# Patient Record
Sex: FEMALE | Race: WHITE | NOT HISPANIC OR LATINO | Employment: UNEMPLOYED | URBAN - NONMETROPOLITAN AREA
[De-identification: names, ages, dates, MRNs, and addresses within clinical notes are randomized per-mention and may not be internally consistent; named-entity substitution may affect disease eponyms.]

---

## 2021-06-14 PROBLEM — I45.6 WPW (WOLFF-PARKINSON-WHITE SYNDROME): Status: ACTIVE | Noted: 2021-06-14

## 2021-07-21 ENCOUNTER — OFFICE VISIT (OUTPATIENT)
Dept: CARDIOLOGY | Facility: CLINIC | Age: 51
End: 2021-07-21

## 2021-07-21 VITALS
SYSTOLIC BLOOD PRESSURE: 130 MMHG | HEIGHT: 66 IN | WEIGHT: 229 LBS | HEART RATE: 77 BPM | DIASTOLIC BLOOD PRESSURE: 90 MMHG | BODY MASS INDEX: 36.8 KG/M2

## 2021-07-21 DIAGNOSIS — R00.2 PALPITATIONS: ICD-10-CM

## 2021-07-21 DIAGNOSIS — R42 DIZZINESS: ICD-10-CM

## 2021-07-21 DIAGNOSIS — I45.6 WPW (WOLFF-PARKINSON-WHITE SYNDROME): Primary | ICD-10-CM

## 2021-07-21 PROCEDURE — 99204 OFFICE O/P NEW MOD 45 MIN: CPT | Performed by: INTERNAL MEDICINE

## 2021-07-21 PROCEDURE — 93000 ELECTROCARDIOGRAM COMPLETE: CPT | Performed by: NURSE PRACTITIONER

## 2021-07-21 RX ORDER — FLECAINIDE ACETATE 100 MG/1
100 TABLET ORAL 2 TIMES DAILY
Qty: 180 TABLET | Refills: 3 | Status: SHIPPED | OUTPATIENT
Start: 2021-07-21 | End: 2021-12-15 | Stop reason: SINTOL

## 2021-07-21 NOTE — PROGRESS NOTES
Cardiac Electrophysiology Outpatient Consult Note            [facility] at [facility]    Consult Note     Taylor Infante  [id number]  07/21/2021  [phone]     Primary Care Physician: Tobias Torres APRN    Referred By: Reggie Jiang DO Subjective     Chief Complaint:   Diagnoses and all orders for this visit:    1. WPW (Desiree-Parkinson-White syndrome) (Primary)    2. Palpitations    3. Dizziness    Other orders  -     ECG 12 Lead      Chief Complaint   Patient presents with   • Desiree-Parkinson-White Syndrome       History of Present Illness:   Taylor Infante is a 50 y.o. female who presents to my electrophysiology clinic for evaluation of her WPW.  Upon my evaluation patient has a very long known history of WPW with attempted ablation x3 in the past.  She reports her first ablation was in the 1990s per Dr. Mccoy that was unsuccessful.  For her second ablation she was referred to [place] to a EP specialist who also performed a second ablation that was unsuccessful.  Patient had a third and last ablation performed in 2003 per Dr. Fontenot and her juancarlos that again was unfortunately unsuccessful.  Patient continues to have intermittent tachypalpitations associated with increased shortness of breath and dizziness that make her feel terrible.  She states she has a prolonged episode at least once a month that will prompt her to present to the ER and stops and comes on the way.  She has had adenosine administration several times in the past year.  She states it is manageable when she receives this.  Patient denies chest pain, presyncope, syncope.  Patient denies TIA/strokelike symptoms.      Review of Systems:   Constitutional: No fevers or chills, no recent weight gain or weight loss or fatigue  Eyes: No visual loss, blurred vision, double vision, yellow sclerae.  ENT: No headaches, hearing loss, vertigo, congestion or sore throat.   Cardiovascular: Per HPI  Respiratory: No  cough or wheezing, no sputum production, no hematemesis   Gastrointestinal: No abdominal pain, no nausea, vomiting, constipation, diarrhea, melena.   Genitourinary: No dysuria, hematuria or increased frequency.  Musculoskeletal:  No gait disturbance, weakness or joint pain or stiffness  Integumentary: No rashes, urticaria, ulcers or sores.   Neurological: No headache, syncope, paralysis, ataxia, no prior CVA/TIA  Psychiatric: No anxiety, or depression  Endocrine: No diaphoresis, cold or heat intolerance. No polyuria or polydipsia.   Hematologic/Lymphatic: No anemia, abnormal bruising or bleeding. No history of DVT/PE.     Past Medical History:   Past Medical History:   Diagnosis Date   • Arrhythmia    • Arthritis    • Chronic back pain    • Compression fracture of lumbar vertebra (CMS/HCC) 8/22/2016    L1-L2 w/ Significant kyphotic deformity. She appears to have solid arthrodesis from L2-L5. Flexion and extension films showed very litte movvement at the L1-L2 level.     • Depression    • Hearing loss    • Hypertension    • Hypothyroidism    • Osteopetrosis    • Venous insufficiency        Past Surgical History:   Past Surgical History:   Procedure Laterality Date   • CARDIAC ELECTROPHYSIOLOGY STUDY AND ABLATION  2003    Dr. Mccoy (Dr. Fontenot assisting)   • CHOLECYSTECTOMY  2003   • HYSTERECTOMY  2002    partial    • MASTOIDECTOMY  1972   • POSTERIOR LUMBAR/THORACIC SPINE FUSION  03/24/2015     l2-l5    • STOMACH SURGERY      Gastric Bypass    • TONSILLECTOMY  1982       Family History:   Family History   Problem Relation Age of Onset   • Diabetes Other    • Alzheimer's disease Other    • Hypertension Other    • Stroke Other    • Valvular heart disease Father    • Cancer Father        Social History:   Social History     Socioeconomic History   • Marital status:      Spouse name: Not on file   • Number of children: Not on file   • Years of education: Not on file   • Highest education level: Not on file    Tobacco Use   • Smoking status: Never Smoker   • Smokeless tobacco: Never Used   Vaping Use   • Vaping Use: Never used   Substance and Sexual Activity   • Alcohol use: Never   • Drug use: Defer   • Sexual activity: Defer       Medications:     Current Outpatient Medications:   •  albuterol sulfate  (90 Base) MCG/ACT inhaler, Inhale 2 puffs Every 4 (Four) Hours As Needed., Disp: , Rfl:   •  amitriptyline (ELAVIL) 10 MG tablet, Take 10 mg by mouth Every Night., Disp: , Rfl:   •  aspirin 81 MG EC tablet, Take 81 mg by mouth Daily., Disp: , Rfl:   •  cetirizine (zyrTEC) 10 MG tablet, Take 10 mg by mouth Daily., Disp: , Rfl:   •  clonazePAM (KlonoPIN) 0.5 MG tablet, Take 0.5 mg by mouth 2 (Two) Times a Day As Needed., Disp: , Rfl:   •  docusate sodium (COLACE) 100 MG capsule, Take 200 mg by mouth Daily., Disp: , Rfl:   •  hydroCHLOROthiazide (HYDRODIURIL) 25 MG tablet, Take 25 mg by mouth Daily., Disp: , Rfl:   •  losartan (COZAAR) 50 MG tablet, Take 50 mg by mouth Daily., Disp: , Rfl:   •  meclizine (ANTIVERT) 25 MG tablet, Take 25 mg by mouth 2 (Two) Times a Day As Needed., Disp: , Rfl:   •  metoprolol succinate XL (TOPROL-XL) 50 MG 24 hr tablet, Take 50 mg by mouth Daily., Disp: , Rfl:   •  metroNIDAZOLE (METROCREAM) 0.75 % cream, Apply  topically to the appropriate area as directed 2 (Two) Times a Day., Disp: , Rfl:   •  omeprazole (priLOSEC) 20 MG capsule, Take 20 mg by mouth Daily., Disp: , Rfl:   •  pain patient supplied pump, by Intrathecal route Continuous. Dilaudid/Bupivicaine, Disp: , Rfl:   •  potassium chloride (K-DUR,KLOR-CON) 10 MEQ CR tablet, Take 10 mEq by mouth Daily., Disp: , Rfl:   •  Probiotic Product (PROBIOTIC DAILY PO), Take  by mouth 3 (Three) Times a Day., Disp: , Rfl:     Allergies:   Allergies   Allergen Reactions   • Bupropion Mental Status Change   • Naltrexone Mental Status Change   • Fentanyl Hives and Itching       Objective   Vital Signs:   Vitals:    07/21/21 1315   BP: 130/90  "  BP Location: Left arm   Patient Position: Sitting   Pulse: 77   Weight: 104 kg (229 lb)   Height: 167.6 cm (66\")       PHYSICAL EXAM  General appearance: Awake, alert, cooperative  Head: Normocephalic, without obvious abnormality, atraumatic  Eyes: Conjunctivae/corneas clear, EOMs intact  Neck: no adenopathy, no carotid bruit, no JVD and thyroid: not enlarged  Lungs: clear to auscultation bilaterally and no rhonchi or crackles\", ' symmetric  Heart: regular rate and rhythm, S1, S2 normal, no murmur, click, rub or gallop  Abdomen: Soft, non-tender, bowel sounds normal,  no organomegaly  Extremities: extremities normal, atraumatic, no cyanosis or edema  Skin: Skin color, turgor normal, no rashes or lesions  Neurologic: Grossly normal     Cardiac Testing:      I personally viewed and interpreted the patient's EKG/Telemetry/lab data      ECG 12 Lead    Date/Time: 7/21/2021 2:02 PM  Performed by: Anurag Mendoza APRN  Authorized by: Anurag Mendoza APRN   Comparison: not compared with previous ECG   Rhythm: sinus rhythm  BPM: 77  Comments: WPW           Assessment & Plan    Diagnoses and all orders for this visit:    1. WPW (Desiree-Parkinson-White syndrome) (Primary)    2. Palpitations    3. Dizziness    Other orders  -     ECG 12 Lead         Diagnosis Plan   1. WPW (Desiree-Parkinson-White syndrome)   clearly highly symptomatic.  Has had multiple episodes of syncope without warning.  Left EP procedure indicated that this is a high risk pathway with a accessory pathway effective refractory period of less than 240 ms.    Manifest preexcitation today.    I think it certainly is worth a fourth attempt.  She has had prior ablations of multiple counters with multiple operators.  These were all several years ago.    At this time she is interested in proceeding with ablation.  We discussed that this procedure would be best performed under general anesthetic.  We discussed that the we have new mapping technologies " and ablation technologies which may typically in her favor.    This procedure will likely take several hours.  There is possibility of keeping her overnight as well.  She understands the risk-benefit profile and I quoted her a 1% chance of significant procedure complication and a approximately 90% chance of illuminating accessory pathway.    In the meantime in order to afford her some freedom from symptoms which are almost daily at this point we will place her on moderate dose of flecainide.  100 mg twice daily will be the dose.  We will have her stop this 3 days before the ablation.  Rhythmia will be the mapping system.    The patient and I made a mutually shared decision ( shared decision making model ) that the patient would be best served by proceeding with the above invasive heart procedure.  This is a high risk invasive cardiac procedure which comes with significant risk of morbidity and mortality.  This patient has significant underlying  heart disease.  Taylor Infante understands these risks and   has made an informed decision to proceed.     2. Palpitations   due to WPW   3. Dizziness   due to WPW     Body mass index is 36.96 kg/m².    I spent 49 minutes in consultation with this patient which included more than 65% of this time in direct face-to-face counseling, physical examination and discussion of my assessment and findings and shared decision making with the patient.  The remainder of the time not spent face to face was performing one, some or all of the following actions:  preparing to see this patient ( eg. Review of tests),  ordering medications, tests or procedures ), care coordination, discussion of the plan with other healthcare providers, documenting clinical information in Epic well as independently interpreting results and communicating results to patient, family and or caregiver.  All time noted occurred on the date of service.    Follow Up:      Scribed for Alberto Doyle DO by Anurag BULL  OMAR Mendoza . 7/21/2021  14:05 EDT     Thank you for allowing me to participate in the care of your patient. Please to not hesitate to contact me with additional questions or concerns.      Alberto Doyle DO, FACC, RS  Cardiac Electrophysiologist  [facility] / [facility]

## 2021-08-05 ENCOUNTER — PREP FOR SURGERY (OUTPATIENT)
Dept: OTHER | Facility: HOSPITAL | Age: 51
End: 2021-08-05

## 2021-08-05 DIAGNOSIS — I45.6 WPW (WOLFF-PARKINSON-WHITE SYNDROME): Primary | ICD-10-CM

## 2021-08-05 RX ORDER — SODIUM CHLORIDE 0.9 % (FLUSH) 0.9 %
3 SYRINGE (ML) INJECTION EVERY 12 HOURS SCHEDULED
Status: CANCELLED | OUTPATIENT
Start: 2021-08-05

## 2021-08-05 RX ORDER — ACETAMINOPHEN 325 MG/1
650 TABLET ORAL EVERY 4 HOURS PRN
Status: CANCELLED | OUTPATIENT
Start: 2021-08-05

## 2021-08-05 RX ORDER — SODIUM CHLORIDE 0.9 % (FLUSH) 0.9 %
10 SYRINGE (ML) INJECTION AS NEEDED
Status: CANCELLED | OUTPATIENT
Start: 2021-08-05

## 2021-08-05 RX ORDER — NITROGLYCERIN 0.4 MG/1
0.4 TABLET SUBLINGUAL
Status: CANCELLED | OUTPATIENT
Start: 2021-08-05

## 2021-08-30 ENCOUNTER — APPOINTMENT (OUTPATIENT)
Dept: PREADMISSION TESTING | Facility: HOSPITAL | Age: 51
End: 2021-08-30

## 2021-08-30 ENCOUNTER — PRE-ADMISSION TESTING (OUTPATIENT)
Dept: PREADMISSION TESTING | Facility: HOSPITAL | Age: 51
End: 2021-08-30

## 2021-08-30 DIAGNOSIS — I45.6 WPW (WOLFF-PARKINSON-WHITE SYNDROME): ICD-10-CM

## 2021-08-30 LAB
ANION GAP SERPL CALCULATED.3IONS-SCNC: 15 MMOL/L (ref 5–15)
BUN SERPL-MCNC: 5 MG/DL (ref 6–20)
BUN/CREAT SERPL: 10.2 (ref 7–25)
CALCIUM SPEC-SCNC: 9.2 MG/DL (ref 8.6–10.5)
CHLORIDE SERPL-SCNC: 101 MMOL/L (ref 98–107)
CO2 SERPL-SCNC: 23 MMOL/L (ref 22–29)
CREAT SERPL-MCNC: 0.49 MG/DL (ref 0.57–1)
DEPRECATED RDW RBC AUTO: 40.3 FL (ref 37–54)
ERYTHROCYTE [DISTWIDTH] IN BLOOD BY AUTOMATED COUNT: 11.6 % (ref 12.3–15.4)
GFR SERPL CREATININE-BSD FRML MDRD: 134 ML/MIN/1.73
GLUCOSE SERPL-MCNC: 143 MG/DL (ref 65–99)
HCT VFR BLD AUTO: 45.2 % (ref 34–46.6)
HGB BLD-MCNC: 15.5 G/DL (ref 12–15.9)
MCH RBC QN AUTO: 32.5 PG (ref 26.6–33)
MCHC RBC AUTO-ENTMCNC: 34.3 G/DL (ref 31.5–35.7)
MCV RBC AUTO: 94.8 FL (ref 79–97)
PLATELET # BLD AUTO: 253 10*3/MM3 (ref 140–450)
PMV BLD AUTO: 9.9 FL (ref 6–12)
POTASSIUM SERPL-SCNC: 4 MMOL/L (ref 3.5–5.2)
RBC # BLD AUTO: 4.77 10*6/MM3 (ref 3.77–5.28)
SARS-COV-2 RNA PNL SPEC NAA+PROBE: NOT DETECTED
SODIUM SERPL-SCNC: 139 MMOL/L (ref 136–145)
WBC # BLD AUTO: 8.02 10*3/MM3 (ref 3.4–10.8)

## 2021-08-30 PROCEDURE — 36415 COLL VENOUS BLD VENIPUNCTURE: CPT

## 2021-08-30 PROCEDURE — C9803 HOPD COVID-19 SPEC COLLECT: HCPCS

## 2021-08-30 PROCEDURE — 85027 COMPLETE CBC AUTOMATED: CPT

## 2021-08-30 PROCEDURE — 80048 BASIC METABOLIC PNL TOTAL CA: CPT

## 2021-08-30 PROCEDURE — U0004 COV-19 TEST NON-CDC HGH THRU: HCPCS

## 2021-08-30 RX ORDER — MULTIVIT WITH MINERALS/LUTEIN
500 TABLET ORAL DAILY
COMMUNITY
End: 2022-04-20 | Stop reason: ALTCHOICE

## 2021-09-01 ENCOUNTER — ANESTHESIA EVENT (OUTPATIENT)
Dept: CARDIOLOGY | Facility: HOSPITAL | Age: 51
End: 2021-09-01

## 2021-09-02 ENCOUNTER — HOSPITAL ENCOUNTER (OUTPATIENT)
Facility: HOSPITAL | Age: 51
Discharge: HOME | End: 2021-09-02
Attending: INTERNAL MEDICINE | Admitting: INTERNAL MEDICINE

## 2021-09-02 ENCOUNTER — ANESTHESIA (OUTPATIENT)
Dept: CARDIOLOGY | Facility: HOSPITAL | Age: 51
End: 2021-09-02

## 2021-09-02 VITALS
OXYGEN SATURATION: 90 % | TEMPERATURE: 97.2 F | HEIGHT: 65 IN | HEART RATE: 76 BPM | DIASTOLIC BLOOD PRESSURE: 80 MMHG | SYSTOLIC BLOOD PRESSURE: 117 MMHG | WEIGHT: 225.97 LBS | BODY MASS INDEX: 37.65 KG/M2 | RESPIRATION RATE: 16 BRPM

## 2021-09-02 DIAGNOSIS — I45.6 WPW (WOLFF-PARKINSON-WHITE SYNDROME): ICD-10-CM

## 2021-09-02 PROCEDURE — C1766 INTRO/SHEATH,STRBLE,NON-PEEL: HCPCS | Performed by: INTERNAL MEDICINE

## 2021-09-02 PROCEDURE — 93621 COMP EP EVL L PAC&REC C SINS: CPT | Performed by: INTERNAL MEDICINE

## 2021-09-02 PROCEDURE — 25010000003 LIDOCAINE 1 % SOLUTION: Performed by: NURSE ANESTHETIST, CERTIFIED REGISTERED

## 2021-09-02 PROCEDURE — C1730 CATH, EP, 19 OR FEW ELECT: HCPCS | Performed by: INTERNAL MEDICINE

## 2021-09-02 PROCEDURE — C1894 INTRO/SHEATH, NON-LASER: HCPCS | Performed by: INTERNAL MEDICINE

## 2021-09-02 PROCEDURE — S0260 H&P FOR SURGERY: HCPCS | Performed by: NURSE PRACTITIONER

## 2021-09-02 PROCEDURE — 25010000002 PROTAMINE SULFATE PER 10 MG: Performed by: INTERNAL MEDICINE

## 2021-09-02 PROCEDURE — 85347 COAGULATION TIME ACTIVATED: CPT

## 2021-09-02 PROCEDURE — 93010 ELECTROCARDIOGRAM REPORT: CPT | Performed by: INTERNAL MEDICINE

## 2021-09-02 PROCEDURE — C1760 CLOSURE DEV, VASC: HCPCS | Performed by: INTERNAL MEDICINE

## 2021-09-02 PROCEDURE — C1759 CATH, INTRA ECHOCARDIOGRAPHY: HCPCS | Performed by: INTERNAL MEDICINE

## 2021-09-02 PROCEDURE — 25010000002 NEOSTIGMINE 10 MG/10ML SOLUTION: Performed by: NURSE ANESTHETIST, CERTIFIED REGISTERED

## 2021-09-02 PROCEDURE — 25010000002 PHENYLEPHRINE 10 MG/ML SOLUTION 1 ML VIAL: Performed by: NURSE ANESTHETIST, CERTIFIED REGISTERED

## 2021-09-02 PROCEDURE — 93653 COMPRE EP EVAL TX SVT: CPT | Performed by: INTERNAL MEDICINE

## 2021-09-02 PROCEDURE — 93613 INTRACARDIAC EPHYS 3D MAPG: CPT | Performed by: INTERNAL MEDICINE

## 2021-09-02 PROCEDURE — C1732 CATH, EP, DIAG/ABL, 3D/VECT: HCPCS | Performed by: INTERNAL MEDICINE

## 2021-09-02 PROCEDURE — 93005 ELECTROCARDIOGRAM TRACING: CPT | Performed by: INTERNAL MEDICINE

## 2021-09-02 PROCEDURE — 25010000002 PROPOFOL 10 MG/ML EMULSION: Performed by: NURSE ANESTHETIST, CERTIFIED REGISTERED

## 2021-09-02 PROCEDURE — 93662 INTRACARDIAC ECG (ICE): CPT | Performed by: INTERNAL MEDICINE

## 2021-09-02 PROCEDURE — 93623 PRGRMD STIMJ&PACG IV RX NFS: CPT | Performed by: INTERNAL MEDICINE

## 2021-09-02 PROCEDURE — 25010000002 HEPARIN (PORCINE) PER 1000 UNITS: Performed by: INTERNAL MEDICINE

## 2021-09-02 PROCEDURE — 25010000002 DEXAMETHASONE PER 1 MG: Performed by: NURSE ANESTHETIST, CERTIFIED REGISTERED

## 2021-09-02 PROCEDURE — 25010000002 ONDANSETRON PER 1 MG: Performed by: NURSE ANESTHETIST, CERTIFIED REGISTERED

## 2021-09-02 RX ORDER — LIDOCAINE HYDROCHLORIDE 10 MG/ML
INJECTION, SOLUTION INFILTRATION; PERINEURAL AS NEEDED
Status: DISCONTINUED | OUTPATIENT
Start: 2021-09-02 | End: 2021-09-02 | Stop reason: SURG

## 2021-09-02 RX ORDER — ASPIRIN 81 MG/1
81 TABLET ORAL DAILY
Status: DISCONTINUED | OUTPATIENT
Start: 2021-09-02 | End: 2021-09-02 | Stop reason: HOSPADM

## 2021-09-02 RX ORDER — ONDANSETRON 2 MG/ML
INJECTION INTRAMUSCULAR; INTRAVENOUS AS NEEDED
Status: DISCONTINUED | OUTPATIENT
Start: 2021-09-02 | End: 2021-09-02 | Stop reason: SURG

## 2021-09-02 RX ORDER — NEOSTIGMINE METHYLSULFATE 1 MG/ML
INJECTION, SOLUTION INTRAVENOUS AS NEEDED
Status: DISCONTINUED | OUTPATIENT
Start: 2021-09-02 | End: 2021-09-02 | Stop reason: SURG

## 2021-09-02 RX ORDER — NITROGLYCERIN 0.4 MG/1
0.4 TABLET SUBLINGUAL
Status: DISCONTINUED | OUTPATIENT
Start: 2021-09-02 | End: 2021-09-02 | Stop reason: HOSPADM

## 2021-09-02 RX ORDER — GLYCOPYRROLATE 0.2 MG/ML
INJECTION INTRAMUSCULAR; INTRAVENOUS AS NEEDED
Status: DISCONTINUED | OUTPATIENT
Start: 2021-09-02 | End: 2021-09-02 | Stop reason: SURG

## 2021-09-02 RX ORDER — PROPOFOL 10 MG/ML
VIAL (ML) INTRAVENOUS AS NEEDED
Status: DISCONTINUED | OUTPATIENT
Start: 2021-09-02 | End: 2021-09-02 | Stop reason: SURG

## 2021-09-02 RX ORDER — PROTAMINE SULFATE 10 MG/ML
INJECTION, SOLUTION INTRAVENOUS AS NEEDED
Status: DISCONTINUED | OUTPATIENT
Start: 2021-09-02 | End: 2021-09-02 | Stop reason: HOSPADM

## 2021-09-02 RX ORDER — HYDROMORPHONE HYDROCHLORIDE 1 MG/ML
0.5 INJECTION, SOLUTION INTRAMUSCULAR; INTRAVENOUS; SUBCUTANEOUS
Status: DISCONTINUED | OUTPATIENT
Start: 2021-09-02 | End: 2021-09-02 | Stop reason: HOSPADM

## 2021-09-02 RX ORDER — ACETAMINOPHEN 325 MG/1
650 TABLET ORAL EVERY 4 HOURS PRN
Status: DISCONTINUED | OUTPATIENT
Start: 2021-09-02 | End: 2021-09-02 | Stop reason: HOSPADM

## 2021-09-02 RX ORDER — DEXAMETHASONE SODIUM PHOSPHATE 4 MG/ML
INJECTION, SOLUTION INTRA-ARTICULAR; INTRALESIONAL; INTRAMUSCULAR; INTRAVENOUS; SOFT TISSUE AS NEEDED
Status: DISCONTINUED | OUTPATIENT
Start: 2021-09-02 | End: 2021-09-02 | Stop reason: SURG

## 2021-09-02 RX ORDER — HEPARIN SODIUM 10000 [USP'U]/100ML
INJECTION, SOLUTION INTRAVENOUS CONTINUOUS PRN
Status: DISCONTINUED | OUTPATIENT
Start: 2021-09-02 | End: 2021-09-02 | Stop reason: HOSPADM

## 2021-09-02 RX ORDER — ROCURONIUM BROMIDE 10 MG/ML
INJECTION, SOLUTION INTRAVENOUS AS NEEDED
Status: DISCONTINUED | OUTPATIENT
Start: 2021-09-02 | End: 2021-09-02 | Stop reason: SURG

## 2021-09-02 RX ORDER — ONDANSETRON 2 MG/ML
4 INJECTION INTRAMUSCULAR; INTRAVENOUS ONCE AS NEEDED
Status: DISCONTINUED | OUTPATIENT
Start: 2021-09-02 | End: 2021-09-02 | Stop reason: HOSPADM

## 2021-09-02 RX ORDER — HYDROCHLOROTHIAZIDE 25 MG/1
25 TABLET ORAL DAILY
Status: DISCONTINUED | OUTPATIENT
Start: 2021-09-03 | End: 2021-09-02 | Stop reason: HOSPADM

## 2021-09-02 RX ORDER — FLECAINIDE ACETATE 50 MG/1
100 TABLET ORAL 2 TIMES DAILY
Status: DISCONTINUED | OUTPATIENT
Start: 2021-09-02 | End: 2021-09-02 | Stop reason: HOSPADM

## 2021-09-02 RX ORDER — SODIUM CHLORIDE 0.9 % (FLUSH) 0.9 %
3 SYRINGE (ML) INJECTION EVERY 12 HOURS SCHEDULED
Status: DISCONTINUED | OUTPATIENT
Start: 2021-09-02 | End: 2021-09-02 | Stop reason: HOSPADM

## 2021-09-02 RX ORDER — IPRATROPIUM BROMIDE AND ALBUTEROL SULFATE 2.5; .5 MG/3ML; MG/3ML
3 SOLUTION RESPIRATORY (INHALATION) ONCE AS NEEDED
Status: DISCONTINUED | OUTPATIENT
Start: 2021-09-02 | End: 2021-09-02 | Stop reason: HOSPADM

## 2021-09-02 RX ORDER — CEFAZOLIN SODIUM 2 G/100ML
2 INJECTION, SOLUTION INTRAVENOUS ONCE
Status: DISCONTINUED | OUTPATIENT
Start: 2021-09-02 | End: 2021-09-02 | Stop reason: HOSPADM

## 2021-09-02 RX ORDER — LOSARTAN POTASSIUM 50 MG/1
50 TABLET ORAL DAILY
Status: DISCONTINUED | OUTPATIENT
Start: 2021-09-03 | End: 2021-09-02 | Stop reason: HOSPADM

## 2021-09-02 RX ORDER — HEPARIN SODIUM 1000 [USP'U]/ML
INJECTION, SOLUTION INTRAVENOUS; SUBCUTANEOUS AS NEEDED
Status: DISCONTINUED | OUTPATIENT
Start: 2021-09-02 | End: 2021-09-02 | Stop reason: HOSPADM

## 2021-09-02 RX ORDER — SODIUM CHLORIDE 0.9 % (FLUSH) 0.9 %
10 SYRINGE (ML) INJECTION AS NEEDED
Status: DISCONTINUED | OUTPATIENT
Start: 2021-09-02 | End: 2021-09-02 | Stop reason: HOSPADM

## 2021-09-02 RX ORDER — METOPROLOL SUCCINATE 50 MG/1
50 TABLET, EXTENDED RELEASE ORAL DAILY
Status: DISCONTINUED | OUTPATIENT
Start: 2021-09-02 | End: 2021-09-02 | Stop reason: HOSPADM

## 2021-09-02 RX ORDER — SODIUM CHLORIDE 9 MG/ML
INJECTION, SOLUTION INTRAVENOUS CONTINUOUS PRN
Status: DISCONTINUED | OUTPATIENT
Start: 2021-09-02 | End: 2021-09-02 | Stop reason: SURG

## 2021-09-02 RX ADMIN — PROPOFOL 150 MG: 10 INJECTION, EMULSION INTRAVENOUS at 11:48

## 2021-09-02 RX ADMIN — SODIUM CHLORIDE: 9 INJECTION, SOLUTION INTRAVENOUS at 11:38

## 2021-09-02 RX ADMIN — ROCURONIUM BROMIDE 20 MG: 10 INJECTION INTRAVENOUS at 12:10

## 2021-09-02 RX ADMIN — LIDOCAINE HYDROCHLORIDE 50 MG: 10 INJECTION, SOLUTION INFILTRATION; PERINEURAL at 11:48

## 2021-09-02 RX ADMIN — ROCURONIUM BROMIDE 10 MG: 10 INJECTION INTRAVENOUS at 13:00

## 2021-09-02 RX ADMIN — ONDANSETRON 4 MG: 2 INJECTION INTRAMUSCULAR; INTRAVENOUS at 14:46

## 2021-09-02 RX ADMIN — DEXAMETHASONE SODIUM PHOSPHATE 8 MG: 4 INJECTION, SOLUTION INTRAMUSCULAR; INTRAVENOUS at 11:53

## 2021-09-02 RX ADMIN — ROCURONIUM BROMIDE 50 MG: 10 INJECTION INTRAVENOUS at 11:48

## 2021-09-02 RX ADMIN — GLYCOPYRROLATE 0.4 MG: 0.2 INJECTION INTRAMUSCULAR; INTRAVENOUS at 14:59

## 2021-09-02 RX ADMIN — PHENYLEPHRINE HYDROCHLORIDE 0.5 MCG/KG/MIN: 10 INJECTION INTRAVENOUS at 12:16

## 2021-09-02 RX ADMIN — NEOSTIGMINE METHYLSULFATE 3 MG: 1 INJECTION, SOLUTION INTRAVENOUS at 14:59

## 2021-09-02 NOTE — OP NOTE
Cardiac Electrophysiology Procedure Note       [facility] at [facility]    CATHETER ABLATION OF SUPRAVENTRICULAR TACHYCARDIA    PROCEDURES PERFORMED:   · Full diagnostic EP study  · Drug infusion / programmed pacing  · left atrial pacing and recording  · 3D electroanatomic mapping  · phased-array intracardiac echocardiography    PREPROCEDURAL DIAGNOSES:    1.  Desiree-Parkinson-White syndrome with documented SVT    POST PROCEDURE DIANGOSES:    1.  Right anterior tricuspid valve annulus accessory pathway /epicardial    CONSENT:  The patient was able to give written informed consent after revisiting the key portions of the risk versus benefit profile of the procedure.  This discussion was framed by our lengthy conversations  (please see our detailed notes).  Patient verbalized strong understanding of this discussion and a strong desire to proceed with the procedure.  Please note that this detailed informed consent process utilized mutual and shared decision making process between all parties involved, principally the physician and patient, but also potentially with input from the patient's selected family and friends.    General anesthesia was selected for this patient    INDICATION FOR PROCEDURE:  Briefly, Taylor Infante is a 51 y.o. year old female with a history of highly symptomatic drug refractory recurrent SVT documented with a long history of suffering from arrhythmia.  She underwent 3 catheter ablations all unsuccessful most recent one was in 2003.  She [facility] a distance center in [facility] for one of her ablation procedures and had the other 2 here at this facility.  She was interested in having a repeat attempt.  She presented electively for this outpatient procedure.    Problem List:     1. WPW  a. S/p RFA x 3   b. S/p RFA, 2003 with Dr. Mccoy   2. HTN  3. Hypothyroidism  4. Arthritis  5. Chronic Back Pain  a. Compression fracture of lumbar vertebra, 8/22/16,  L1-L2 w/ significant kyphotic deformity. Solid arthrodesis from L2-L5.   6. Depression  7. Hearing loss   8. Osteoporosis  9. Venous insufficiency  10. Past Surgical Hx  a. RFA, 2003, Dr. Mccoy  b. Cholecystectomy, 2003  c. Partial hysterectomy, 2002  d. Mastoidectomy, 1972  e. Posterior lumbar/ thoracic spine infusion, L2-L5, 3/24/15  f. Gastric bypass  g. Tonsillectomy, 1982       PROCEDURE NARRATIVE:    The patient was brought to the EP laboratory in the post absorptive state. The patient was then prepared and draped in a routing sterile fashion.  Seldinger access was obtained at the right common femoral vein with three venipunctures.  J tip wires were advanced into the vascular space.  A short 6 [place] sheaths and an SRO sheath were placed into the right common femoral vein and the inferior vena cava / right atrium in an over the wire fashion.    CATHETERS USED FOR THE PROCEDURE:    Quadripolar electrophysiology catheters was placed to the right ventricular apex.    A crd 2 quadripolar electrophysiology catheter was placed to the compact AV node his bundle position.    An 8 [place] decapolar electrophysiology catheter was placed into the coronary sinus for left atrial pacing recording.    64 electrode [employer] [place] electrophysiology catheter was used for pacing recording the right atrium right ventricle the right atrial appendage Triangle of Harris His bundle and coronary sinus.    A 4 mm [employer] irrigated tip ablation catheter was used for ablation as well as for pacing recording and all of the above-mentioned structures.    Adequate pacing thresholds were confirmed for all catheters.    Complete diagnostic EP study was performed at this point.  Data obtained from this is listed in the below table:     Initial Study    Isuprel Washout Study      NV  86     drive train / burst extrastim    QRS  136    Rhythm      QT  450    Atrial CL      R-R  908     Ventricular CL      AH  59    AVBCL       HV  -3    AVNERP       drive train / burst extrastim   Slow Pway ERP      Rhythm     Fast Pway ERP      Atrial CL     VABCL conc? /  Dec?      Ventricular CL     VAERP      AVBCL  290    AERP      AVNERP  600  270   VERP      Slow Pway ERP     AP antegrade ERP      Fast Pway ERP     AP retrograde ERP      VABCL conc? /  Dec?   340          VAERP  600 /400  270/230  Final Study      AERP      drive train / burst extrastim    VERP  400  230   Rhythm  pre-excited sinus rhythm     AP antegrade ERP  600  greater than 270   Atrial CL      AP retrograde ERP  600  greater than 350   Ventricular CL           AVBCL  350    Isuprel Dose =      AVNERP  600  240     drive train / burst extrastim   Slow Pway ERP      Rhythm     Fast Pway ERP      Atrial CL     VABCL conc? /  Dec?   270     Ventricular CL     VAERP  600  less than 270    AVBCL     AERP      AVNERP     VERP      Slow Pway ERP     AP antegrade ERP      Fast Pway ERP     AP retrograde ERP      VABCL conc? /  Dec?           VAERP           AERP           VERP           AP antegrade ERP           AP retrograde ERP                         VT Induction EP study ([facility] Protocol)        No Isuprel     Site One (RV [place] /  RV Septum)      Drive Train V ERP    350     400     600 / 550         Site Two (RV Outflow Tract /  RV Septum)     Drive Train V ERP    350     400     600 / 550         Isuprel Dose =      Site One (RV [place] /  RV Septum)      Drive Train V ERP    350     400     600 / 550         Site Two (RV Outflow Tract /  RV Septum)     Drive Train V ERP    350     400     600 / 550        Please note we used the [employer] 3D electroanatomic mapping system to construct a highly detailed structure of the tachycardia circuit right atrium right ventricle AV node position His bundle position triangle of Harris coronary sinus compact AV node and both right and left with extensions of the AV node slow pathway.  Activation map were performed during atrial  "pacing at cycle length of 350 ms to allow for maximum preexcitation.  Additionally also mapped and obtained to completely except.  Activation map during orthodromic reciprocating tachycardia.  More than 10,000 activation points were obtained.    MANEUVERS FOR SVT DIAGNOSIS:    SVT was very easily induced.  Atrial burst pacing or atrial extrastimulus testing was successful in inducing tachycardia.  Whereas there was preexcitation during sinus rhythm during SVT there was none.  VA activation was concentric.  There was a one-to-one AV relationship.      During tachycardia, I paced the ventricle at 20 milliseconds faster than the tachycardia cycle length.  During pacing, the atrium was accelerated to the pacing cycle length.  Upon cessation of pacing the tachycardia continued at its initial cycle length and a \"VAV\" response was noted.  Thus atrial tachycardia was excluded.    Additionally, the post pacing interval was noted to be 80.  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      ABLATION PORTION OF THE PROCEDURE:    After extensive and careful mapping during tachycardia as well as during presided sinus rhythm and atrial paced rhythm we carefully positioned the ablation catheter at the tricuspid valve annulus endocardially at the site of earliest ventricular activation.  Radiofrequency application was given here.  There is evident area of scar from prior ablation attempts that was just on the atrial aspect of the tricuspid valve annulus.  Our earliest activation was distinct from this area.  This was healthy tissue where we ablated.  There was a balanced AV signal.  There was however no discrete can potential.  Radiofrequency application was performed to maximal amador of 50 with excellent stability for up to 60 seconds duration.  Impedance drops were up to 30 ohms were " noted using local impedance technology proprietary from [employer].  This did not interrupt AV conduction down the accessory pathway but rather did transiently diminished preexcitation.  Extensive ablation was performed unsuccessfully in this area.  We then turned our attention to delivering energy to the atrial insertion of the accessory pathway which was mapped carefully during orthodromic reciprocating tachycardia.  Again discrete count potentials were not noted.  There was a balanced AV signal this area was slightly superior on the tricuspid valve annulus to the ventricular insertion.  This was also distinct from prior ablation sites.  There was no scar evident in this area.  We were unsuccessful illuminating retrograde or antegrade accessory pathway conduction in this fashion.    After trying for more than 3 hours to eliminate accessory pathway conduction we felt that we were not going to be able to reach this from the tricuspid valve annulus.  I carefully mapped the right atrial appendage.  There was no AV fusion here whatsoever.  This is not a right atrial appendage accessory pathway.    At this point we felt that the accessory pathway was likely epicardial.    This point we terminated the procedure.  Final EP testing was performed.  The patient was still inducible for SVT at the end.    Catheters and sheaths were then removed from the body.     Hemostasis with Vascade closure device x4 and right and left common femoral veins.  Bedrest x2 hours.  Home later today.    The patient transferred to recovery in stable condition.     COMPLICATIONS: none    EBL: minimal    RADIATION EXPOSURE: 35 mGy over 7.1 minutes    KEY PROCEDURAL FINDINGS:    · Unsuccessful catheter ablation of anterior tricuspid valve annulus accessory pathway likely due to epicardial course.  · Normal AV node and His-Purkinje system function    POST PROCEDURAL PLAN:    ·  The patient will be observed with the usual recovery process  and  then I anticipate that  the patient can likely be discharged home later today.  · Aspirin therapy for 30 days.  · Medications were reconciled, and key changes in medications include: Stay on all medications  · The patient will be seen at our office per routine follow up.  · We will discuss in follow-up in the office with the patient in 6 to 8 weeks whether she desires to proceed with an epicardial ablation procedure targeting her Desiree-Parkinson-White syndrome.  It was discussed with the patient's family member present for this case.     Alberto Doyle, DO

## 2021-09-02 NOTE — H&P
Cardiology H&P     Taylor Infante  1970  [phone]  There is no work phone number on file.    09/02/21    DATE OF ADMISSION: 9/2/2021  [facility] Tobias Santos, APRN  [address] [facility] / [facility] 4*  Referring Provider: Alberto Doyle DO     No chief complaint on file.    CC: WPW / palps     Problem List:    1. WPW  a. S/p RFA x 3   b. S/p RFA, 2003 with Dr. Mccoy   2. HTN  3. Hypothyroidism  4. Arthritis  5. Chronic Back Pain  a. Compression fracture of lumbar vertebra, 8/22/16, L1-L2 w/ significant kyphotic deformity. Solid arthrodesis from L2-L5.   6. Depression  7. Hearing loss   8. Osteoporosis  9. Venous insufficiency  10. Past Surgical Hx  a. RFA, 2003, Dr. Mccoy  b. Cholecystectomy, 2003  c. Partial hysterectomy, 2002  d. Mastoidectomy, 1972  e. Posterior lumbar/ thoracic spine infusion, L2-L5, 3/24/15  f. Gastric bypass  g. Tonsillectomy, 1982    History of Present Illness:   Taylor Infante is a 50 y.o. female with above-noted past medical history presents today with plan to undergo EPS +/-RFA WPW arrhythmia Dr. Doyle.  Patient was seen in consultation with Dr. Doyle on 7/21/2021 for further evaluation of her WPW. Upon Dr. Doyle's evaluation patient has a very long known history of WPW with attempted ablation x3 in the past. She reports her first ablation was in the 1990s per Dr. Mccoy that was unsuccessful.  For her second ablation she was referred to [place] to a EP specialist who also performed a second ablation that was unsuccessful. Patient had a third and last ablation performed in 2003 per Dr. Fontenot and her that again was unfortunately unsuccessful. Patient continues to have moderate intermittent tachypalpitations associated with increased shortness of breath and dizziness that make her feel terrible. She states she has a prolonged episode at least once a month that will prompt her to present to the ER and stops and comes on the  way. She has had adenosine administration several times in the past year. She states it is manageable when she receives this. At time of consultation, patient was initiated on Flecainide 100 mg bid for suppression of tachypalpitations unfortunately patient could not tolerate due to severe fatigue and has thus not been on the medication. Patient does tell me she had her top teeth removed 3 weeks ago due to persistent cavities and since, palps have seemed to calm down slightly. Patient denies chest pain, presyncope, syncope.  Patient denies TIA/strokelike symptoms.  No recent hospitalizations or ED visits.  She has been n.p.o. since midnight.  Flecainide held x3 days.  BP stable.     Allergies   Allergen Reactions   • Bupropion Mental Status Change   • Naltrexone Mental Status Change   • Fentanyl Hives and Itching        Cannot display prior to admission medications because the patient has not been admitted in this contact.            Current Facility-Administered Medications:   •  acetaminophen (TYLENOL) tablet 650 mg, 650 mg, Oral, Q4H PRN, Anurag Mendoza APRN  •  ceFAZolin in dextrose (ANCEF) IVPB solution 2 g, 2 g, Intravenous, Once, Bia Amaya APRN  •  nitroglycerin (NITROSTAT) SL tablet 0.4 mg, 0.4 mg, Sublingual, Q5 Min PRN, Anurag Mendoza APRN  •  sodium chloride 0.9 % flush 10 mL, 10 mL, Intravenous, PRN, Anurag Mendoza, OMAR  •  sodium chloride 0.9 % flush 3 mL, 3 mL, Intravenous, Q12H, Anurag Mendoza APRN    Social History     Socioeconomic History   • Marital status:      Spouse name: Not on file   • Number of children: Not on file   • Years of education: Not on file   • Highest education level: Not on file   Tobacco Use   • Smoking status: Former Smoker     Packs/day: 2.00     Years: 30.00     Pack years: 60.00     Types: Cigarettes   • Smokeless tobacco: Never Used   Vaping Use   • Vaping Use: Never used   Substance and Sexual Activity   • Alcohol  "use: Never   • Drug use: Never   • Sexual activity: Defer       Family History   Problem Relation Age of Onset   • Diabetes Other    • Alzheimer's disease Other    • Hypertension Other    • Stroke Other    • Valvular heart disease Father    • Cancer Father        REVIEW OF SYSTEMS:   CONSTITUTIONAL:         No weight loss, fever, chills, weakness or fatigue. +palps  HEENT:                            No visual loss, blurred vision, double vision, yellow sclerae.                                             No hearing loss, congestion, sore throat.   SKIN:                                No rashes, urticaria, ulcers, sores.     RESPIRATORY:               + shortness of breath, -hemoptysis, cough, sputum.   GI:                                     No anorexia, nausea, vomiting, diarrhea. No abdominal pain, melena.   :                                   No burning on urination, hematuria or increased frequency.  ENDOCRINE:                   No diaphoresis, cold or heat intolerance. No polyuria or polydipsia.   NEURO:                            No headache, +dizziness, +syncope, -paralysis, ataxia, or parasthesias.                                            No change in bowel or bladder control. No history of CVA/TIA  MUSCULOSKELETAL:    No muscle, back pain, joint pain or stiffness.   HEMATOLOGY:               No anemia, bleeding, bruising. No history of DVT/PE.  PSYCH:                            No history of depression, anxiety    Vitals:    09/02/21 1035   BP: 125/73   BP Location: Left arm   Patient Position: Sitting   Pulse: 71   Resp: 18   Temp: 96 °F (35.6 °C)   TempSrc: Temporal   SpO2: 93%   Height: 165.1 cm (65\")         Vital Sign Min/Max for last 24 hours  Temp  Min: 96 °F (35.6 °C)  Max: 96 °F (35.6 °C)   BP  Min: 125/73  Max: 125/73   Pulse  Min: 71  Max: 71   Resp  Min: 18  Max: 18   SpO2  Min: 93 %  Max: 93 %   No data recorded    No intake or output data in the 24 hours ending 09/02/21 1109    "       Physical Exam:  GEN: Well nourished, Well- developed  No acute distress  HEENT: Normocephalic, Atraumatic, PERRLA, moist mucous membranes  NECK: supple, NO JVD, no thyromegaly, no lymphadenopathy  CARDIAC: S1S2  RRR no murmur, gallop, rub  LUNGS: Clear to ausculation, normal respiratory effort  ABDOMEN: Soft, nontender, normal bowel sounds  EXTREMITIES:No gross deformities,  No clubbing, cyanosis, or edema  SKIN: Warm, dry  NEURO: No focal deficits  PSYCHIATRIC: Normal affect and mood      I personally viewed and interpreted the patient's EKG/Telemetry/lab data    Data:   Results from last 7 days   Lab Units 08/30/21  1258   WBC 10*3/mm3 8.02   HEMOGLOBIN g/dL 15.5   HEMATOCRIT % 45.2   PLATELETS 10*3/mm3 253     Results from last 7 days   Lab Units 08/30/21  1258   SODIUM mmol/L 139   POTASSIUM mmol/L 4.0   CHLORIDE mmol/L 101   CO2 mmol/L 23.0   BUN mg/dL 5*   CREATININE mg/dL 0.49*   GLUCOSE mg/dL 143*                                  No intake or output data in the 24 hours ending 09/02/21 1109    Chest X-Ray:  Imaging Results (Last 24 Hours)     ** No results found for the last 24 hours. **        COVID19   Date Value Ref Range Status   08/30/2021 Not Detected Not Detected - Ref. Range Final     Telemetry: NSR 70 bpm   EKG: None for review today     Assessment and Plan:     1. WPW  - s/p RFA x 3 in the past. Continues to have breakthrough tachy palpitations multiple times monthly and are highly symptomatic, with multiple episodes of syncope without warning. Tried on flecainide in July 2021, but unable to tolerate due to severe fatigue. Patient would benefit from redo RFA as it has been several years since her last ablation. Plan for patient to undergo EPS +/- RFA with Dr. Doyle today. The risks, benefits, and alternatives of the procedure have been reviewed and the patient wishes to proceed. NPO since MN. Covid test negative.     -Patient does have a spinal stimulator for back pain, will order cefazolin  for surgical prophylaxis.     Electronically signed by OMAR Wilson, 09/02/21, 10:12 AM EDT.

## 2021-09-02 NOTE — ANESTHESIA PREPROCEDURE EVALUATION
Anesthesia Evaluation                  Airway   Mallampati: II  Dental      Pulmonary    Cardiovascular     (+) hypertension,       Neuro/Psych  GI/Hepatic/Renal/Endo    (+)  GERD,      Musculoskeletal     Abdominal    Substance History      OB/GYN          Other                        Anesthesia Plan    ASA 3     intravenous induction     Anesthetic plan, all risks, benefits, and alternatives have been provided, discussed and informed consent has been obtained with: patient.

## 2021-09-02 NOTE — ANESTHESIA PROCEDURE NOTES
Airway  Urgency: elective    Date/Time: 9/2/2021 11:50 AM  Airway not difficult    General Information and Staff    Patient location during procedure: OR  CRNA: Fabiana Garrett CRNA    Indications and Patient Condition  Indications for airway management: airway protection    Preoxygenated: yes  MILS not maintained throughout  Mask difficulty assessment: 1 - vent by mask    Final Airway Details  Final airway type: endotracheal airway      Successful airway: ETT  Cuffed: yes   Successful intubation technique: direct laryngoscopy  Facilitating devices/methods: intubating stylet  Endotracheal tube insertion site: oral  Blade: Rosario  Blade size: 3  ETT size (mm): 7.0  Cormack-Lehane Classification: grade I - full view of glottis  Placement verified by: chest auscultation and capnometry   Cuff volume (mL): 7  Measured from: lips  ETT/EBT  to lips (cm): 20  Number of attempts at approach: 1  Assessment: lips, teeth, and gum same as pre-op and atraumatic intubation    Additional Comments  Negative epigastric sounds, Breath sound equal bilaterally with symmetric chest rise and fall

## 2021-09-02 NOTE — ANESTHESIA POSTPROCEDURE EVALUATION
Patient: Taylor Infante    Procedure Summary     Date: 09/02/21 Room / Location: [facility] LAB E / [facility] INVASIVE LOCATION    Anesthesia Start: 1138 Anesthesia Stop:     Procedure: EP/Ablation WPW, Rythmia, GA, hold Flec 3 days (N/A ) Diagnosis:       WPW (Desiree-Parkinson-White syndrome)      (WPW)    Providers: Alberto Doyle DO Provider: Lokesh Martin MD    Anesthesia Type: Not recorded ASA Status: 3          Anesthesia Type: No value filed.    Vitals  No vitals data found for the desired time range.          Post Anesthesia Care and Evaluation    Patient location during evaluation: PACU  Patient participation: complete - patient participated  Level of consciousness: awake  Pain score: 0  Pain management: adequate  Airway patency: patent  Anesthetic complications: No anesthetic complications  PONV Status: none  Cardiovascular status: acceptable and stable  Respiratory status: nasal cannula, unassisted, acceptable and spontaneous ventilation  Hydration status: acceptable

## 2021-09-03 ENCOUNTER — CALL CENTER PROGRAMS (OUTPATIENT)
Dept: CALL CENTER | Facility: HOSPITAL | Age: 51
End: 2021-09-03

## 2021-09-03 LAB — ACT BLD: 301 SECONDS (ref 82–152)

## 2021-09-03 NOTE — OUTREACH NOTE
PCI/Device Survey      Responses   Facility patient discharged from?  [place]   Procedure date  09/02/21   Procedure (if device, specify in description)  PCI   PCI site  Right, Left, Groin   Performing MD  -- [Manav]   Attempt successful?  Yes   Call start time  1247   Call end time  1253   Is the patient taking prescribed medications:  ASA   Nursing intervention  Nurse provided patient education, Reminded to continue to take prescribed medications   Does the patient have any of the following symptoms related to the cath/surgical site?  Bruising [Cough started during the night.]   Nursing intervention  Patient education provided   Does the patient have an appointment scheduled with the cardiologist?  Yes   Appointment comments  Appt for October 20   If the patient is a current smoker, are they able to teach back resources for cessation?  Not a smoker   Did the patient feel prepared to go home on the same day as the procedure?  Yes   Is the patient satisfied with the same day discharge process?  Yes   Discharge process comments  She is aware of where the problem is but may have to have an open procedure. She is deciding.   PCI/Device call completed  Yes   Wrap up additional comments  She feels much better today.          Maryjane Hussein RN

## 2021-09-07 LAB — ACT BLD: 329 SECONDS (ref 82–152)

## 2021-09-21 LAB
QT INTERVAL: 436 MS
QTC INTERVAL: 499 MS

## 2021-12-15 ENCOUNTER — OFFICE VISIT (OUTPATIENT)
Dept: CARDIOLOGY | Facility: CLINIC | Age: 51
End: 2021-12-15

## 2021-12-15 VITALS
BODY MASS INDEX: 36.32 KG/M2 | DIASTOLIC BLOOD PRESSURE: 82 MMHG | HEIGHT: 65 IN | HEART RATE: 68 BPM | SYSTOLIC BLOOD PRESSURE: 118 MMHG | WEIGHT: 218 LBS

## 2021-12-15 DIAGNOSIS — I45.6 WPW (WOLFF-PARKINSON-WHITE SYNDROME): Primary | ICD-10-CM

## 2021-12-15 DIAGNOSIS — J01.10 ACUTE NON-RECURRENT FRONTAL SINUSITIS: ICD-10-CM

## 2021-12-15 DIAGNOSIS — I47.1 PAROXYSMAL SVT (SUPRAVENTRICULAR TACHYCARDIA) (HCC): ICD-10-CM

## 2021-12-15 DIAGNOSIS — R00.2 PALPITATIONS: ICD-10-CM

## 2021-12-15 PROBLEM — I47.10 PAROXYSMAL SVT (SUPRAVENTRICULAR TACHYCARDIA): Status: ACTIVE | Noted: 2021-12-15

## 2021-12-15 PROCEDURE — 99214 OFFICE O/P EST MOD 30 MIN: CPT | Performed by: INTERNAL MEDICINE

## 2021-12-15 RX ORDER — AMOXICILLIN AND CLAVULANATE POTASSIUM 875; 125 MG/1; MG/1
1 TABLET, FILM COATED ORAL 2 TIMES DAILY
Qty: 14 TABLET | Refills: 0 | Status: SHIPPED | OUTPATIENT
Start: 2021-12-15 | End: 2021-12-22

## 2021-12-15 RX ORDER — ATORVASTATIN CALCIUM 40 MG/1
40 TABLET, FILM COATED ORAL DAILY
COMMUNITY

## 2021-12-15 RX ORDER — AMOXICILLIN AND CLAVULANATE POTASSIUM 875; 125 MG/1; MG/1
1 TABLET, FILM COATED ORAL EVERY 12 HOURS SCHEDULED
Status: DISCONTINUED | OUTPATIENT
Start: 2021-12-15 | End: 2021-12-15

## 2021-12-15 RX ORDER — LEVALBUTEROL TARTRATE 45 UG/1
AEROSOL, METERED ORAL
COMMUNITY

## 2021-12-15 RX ORDER — GABAPENTIN 300 MG/1
300 CAPSULE ORAL 3 TIMES DAILY
COMMUNITY

## 2021-12-15 NOTE — PROGRESS NOTES
Cardiac Electrophysiology Outpatient Follow Up Note            [facility]    Follow Up Office Visit      Taylor Infante  [id number]  12/15/2021  [unfilled]  [unfilled]    Primary Care Physician: Griffin Guo MD    Referred By: No ref. provider found    Subjective     Chief Complaint:   Diagnoses and all orders for this visit:    1. WPW (Desiree-Parkinson-White syndrome) (Primary)    2. Palpitations    3. Paroxysmal SVT (supraventricular tachycardia) (HCC)    4. Acute non-recurrent frontal sinusitis  -     amoxicillin-clavulanate (AUGMENTIN) 875-125 MG per tablet 1 tablet      Chief Complaint   Patient presents with   • Desiree-Parkinson-White Syndrome       History of Present Illness:   Taylor Infante is a 51 y.o. female who presents to my electrophysiology clinic for follow up of above complaints.  Taylor continues to have palpitations.  They are the same.  They will occur with some frequency several times a week.  She notes with debility she has been living with him for some many years now.  Thank you feel poorly.  Short of breath when it happens over far from that she feels okay.    No chest pain nausea vomiting fevers chills.    Her but 5 or 6 days now she has had significant worsening tenderness and swelling in her right cheekbone consistent with a sinus infection.  Little bit of mucopurulent drainage from this area she wonders if she can have an antibiotic.      Review of Systems:   Constitutional: No fevers or chills, no recent weight gain or weight loss or fatigue  Eyes: No visual loss, blurred vision, double vision, yellow sclerae.  ENT: No headaches, hearing loss, vertigo, congestion or sore throat.   Cardiovascular: Per HPI  Respiratory: No cough or wheezing, no sputum production, no hematemesis   Gastrointestinal: No abdominal pain, no nausea, vomiting, constipation, diarrhea, melena.   Genitourinary: No dysuria, hematuria or increased  frequency.  Musculoskeletal:  No gait disturbance, weakness or joint pain or stiffness  Integumentary: No rashes, urticaria, ulcers or sores.   Neurological: No headache, dizziness, syncope, paralysis, ataxia, no prior CVA/TIA  Psychiatric: No anxiety, or depression  Endocrine: No diaphoresis, cold or heat intolerance. No polyuria or polydipsia.   Hematologic/Lymphatic: No anemia, abnormal bruising or bleeding. No history of DVT/PE.      Past Medical History:   Past Medical History:   Diagnosis Date   • A-fib ([facility])    • Arrhythmia    • Arthritis    • Chronic back pain    • Compression fracture of lumbar vertebra ([facility]) 8/22/2016    L1-L2 w/ Significant kyphotic deformity. She appears to have solid arthrodesis from L2-L5. Flexion and extension films showed very litte movvement at the L1-L2 level.     • Depression    • Diabetes mellitus ([facility])    • Facial sweating    • GERD (gastroesophageal reflux disease)    • Hyperlipidemia    • Hypertension    • Hypothyroidism    • Osteopetrosis    • SVT (supraventricular tachycardia) ([facility])    • Syncope    • Venous insufficiency    • Wears dentures     full top only    • Wears glasses    • WPW (Desiree-Parkinson-White syndrome)        Past Surgical History:   Past Surgical History:   Procedure Laterality Date   • APPENDECTOMY      at age 24   • CARDIAC ELECTROPHYSIOLOGY PROCEDURE N/A 9/2/2021    Procedure: EP/Ablation WPW, Rythmia, GA, hold Flec 3 days;  Surgeon: Alberto Doyle DO;  Location: [facility] INVASIVE LOCATION;  Service: Cardiology;  Laterality: N/A;   • CARDIAC ELECTROPHYSIOLOGY STUDY AND ABLATION  2003    Dr. Mccoy (Dr. Fontenot assisting)   • CHOLECYSTECTOMY  2003   • HYSTERECTOMY  2002    partial    • PAIN PUMP INSERTION/REVISION  2017   • POSTERIOR LUMBAR/THORACIC SPINE FUSION  03/24/2015     l2-l5    • TONSILLECTOMY  1982       Family History:   Family History   Problem Relation Age of Onset   • Diabetes Other    • Alzheimer's disease Other    • Hypertension Other     • Stroke Other    • Valvular heart disease Father    • Cancer Father        Social History:   Social History     Socioeconomic History   • Marital status:    Tobacco Use   • Smoking status: Former Smoker     Packs/day: 2.00     Years: 30.00     Pack years: 60.00     Types: Cigarettes   • Smokeless tobacco: Never Used   Vaping Use   • Vaping Use: Never used   Substance and Sexual Activity   • Alcohol use: Never   • Drug use: Never   • Sexual activity: Defer       Medications:     Current Outpatient Medications:   •  aspirin 81 MG EC tablet, Take 81 mg by mouth Daily., Disp: , Rfl:   •  atorvastatin (LIPITOR) 40 MG tablet, Take 40 mg by mouth Daily., Disp: , Rfl:   •  cetirizine (zyrTEC) 10 MG tablet, Take 10 mg by mouth Daily., Disp: , Rfl:   •  clonazePAM (KlonoPIN) 0.5 MG tablet, Take 0.5 mg by mouth 2 (Two) Times a Day As Needed., Disp: , Rfl:   •  docusate sodium (COLACE) 100 MG capsule, Take 200 mg by mouth Daily., Disp: , Rfl:   •  gabapentin (NEURONTIN) 300 MG capsule, Take 300 mg by mouth 3 (Three) Times a Day., Disp: , Rfl:   •  hydroCHLOROthiazide (HYDRODIURIL) 25 MG tablet, Take 25 mg by mouth Daily., Disp: , Rfl:   •  levalbuterol (XOPENEX HFA) 45 MCG/ACT inhaler, levalbuterol HFA 45 mcg/actuation aerosol inhaler, Disp: , Rfl:   •  losartan (COZAAR) 50 MG tablet, Take 50 mg by mouth Daily., Disp: , Rfl:   •  meclizine (ANTIVERT) 25 MG tablet, Take 25 mg by mouth 2 (Two) Times a Day As Needed., Disp: , Rfl:   •  metFORMIN (GLUCOPHAGE) 500 MG tablet, Take 500 mg by mouth 2 (Two) Times a Day With Meals., Disp: , Rfl:   •  metoprolol succinate XL (TOPROL-XL) 50 MG 24 hr tablet, Take 50 mg by mouth Daily., Disp: , Rfl:   •  metroNIDAZOLE (METROCREAM) 0.75 % cream, Apply  topically to the appropriate area as directed 2 (Two) Times a Day., Disp: , Rfl:   •  omeprazole (priLOSEC) 20 MG capsule, Take 20 mg by mouth Daily., Disp: , Rfl:   •  pain patient supplied pump, by Intrathecal route Continuous.  "Dilaudid/Bupivicaine, Disp: , Rfl:   •  potassium chloride (K-DUR,KLOR-CON) 10 MEQ CR tablet, Take 10 mEq by mouth Daily., Disp: , Rfl:   •  vitamin C (ASCORBIC ACID) 250 MG tablet, Take 500 mg by mouth Daily., Disp: , Rfl:     Current Facility-Administered Medications:   •  amoxicillin-clavulanate (AUGMENTIN) 875-125 MG per tablet 1 tablet, 1 tablet, Oral, Q12H, Alberto Doyle DO    Allergies:   Allergies   Allergen Reactions   • Bupropion Mental Status Change   • Naltrexone Mental Status Change   • Fentanyl Hives and Itching       Objective   Vital Signs:   Vitals:    12/15/21 1145   BP: 118/82   BP Location: Left arm   Patient Position: Sitting   Pulse: 68   Weight: 98.9 kg (218 lb)   Height: 165.1 cm (65\")       PHYSICAL EXAM  General appearance: Awake, alert, cooperative  Head: Normocephalic, without obvious abnormality, atraumatic  Eyes: Conjunctivae/corneas clear, EOMs intact  Neck: no adenopathy, no carotid bruit, no JVD and thyroid: not enlarged  Lungs: clear to auscultation bilaterally and no rhonchi or crackles\", ' symmetric  Heart: regular rate and rhythm, S1, S2 normal, no murmur, click, rub or gallop  Abdomen: Soft, non-tender, bowel sounds normal,  no organomegaly  Extremities: extremities normal, atraumatic, no cyanosis or edema  Skin: Skin color, turgor normal, no rashes or lesions  Neurologic: Grossly normal     Lab Results   Component Value Date    GLUCOSE 143 (H) 08/30/2021    CALCIUM 9.2 08/30/2021     08/30/2021    K 4.0 08/30/2021    CO2 23.0 08/30/2021     08/30/2021    BUN 5 (L) 08/30/2021    CREATININE 0.49 (L) 08/30/2021    EGFRIFNONA 134 08/30/2021    BCR 10.2 08/30/2021    ANIONGAP 15.0 08/30/2021     Lab Results   Component Value Date    WBC 8.02 08/30/2021    HGB 15.5 08/30/2021    HCT 45.2 08/30/2021    MCV 94.8 08/30/2021     08/30/2021     No results found for: INR, PROTIME  No results found for: TSH, O9UXNXB, I5POFAI, THYROIDAB    Cardiac Testing:     I " personally viewed and interpreted the patient's EKG/Telemetry/lab data    Procedures    Tobacco Cessation: N/A  Obstructive Sleep Apnea Screening: N/A    Assessment & Plan    Diagnoses and all orders for this visit:    1. WPW (Desiree-Parkinson-White syndrome) (Primary)    2. Palpitations    3. Paroxysmal SVT (supraventricular tachycardia) (HCC)    4. Acute non-recurrent frontal sinusitis  -     amoxicillin-clavulanate (AUGMENTIN) 875-125 MG per tablet 1 tablet         Diagnosis Plan   1. WPW (Desiree-Parkinson-White syndrome)   highly symptomatic recurrent Desiree-Parkinson-White syndrome.    Epicardial right anterior accessory pathway.  Multiple unsuccessful attempts at endocardial ablation including 1 performed by myself.    Along with her the options which are continue conservative wait-and-see approach which is what she has been doing now for many years.  It is not likely that this is going to improve.  She feels terrible much of the time.    Antiarrhythmic drug options were also discussed however she has a significant amount of trepidation about these.  Risk-benefit profile of a repeat catheter ablation this time with epicardial instrumentation was discussed extensively.  This is her strong preference.  She wishes to have an epicardial ablation procedure.    That said she is wheezing [employer] and is transitioning to a different kind of insurance, January.  She wants to have a discussion with our precertification team about what this would mean for her financially.  We will arrange for this.    The meantime she has a nasty sinus infection and asked me for an antibiotic.  I will prescribe her some Augmentin.   2. Palpitations   secondary to WPW.   3. Paroxysmal SVT (supraventricular tachycardia) (HCC)   orthodromic reciprocating tachycardia.  Secondary accessory pathway.  As above.   4. Acute non-recurrent frontal sinusitis  amoxicillin-clavulanate (AUGMENTIN) 875-125 MG per tablet 1 tablet     Body mass index is  36.28 kg/m².    I spent 42 minutes in consultation with this patient which included more than 65% of this time in direct face-to-face counseling, physical examination and discussion of my assessment and findings and shared decision making with the patient.  The remainder of the time not spent face to face was performing one, some or all of the following actions:  preparing to see this patient ( eg. Review of tests),  ordering medications, tests or procedures ), care coordination, discussion of the plan with other healthcare providers, documenting clinical information in Epic well as independently interpreting results and communicating results to patient, family and or caregiver.  All time noted occurred on the date of service.    Follow Up:       Thank you for allowing me to participate in the care of your patient. Please to not hesitate to contact me with additional questions or concerns.      Alberto Doyle DO, FACC, FHRS  Cardiac Electrophysiologist  [facility] / [facility]

## 2022-02-15 ENCOUNTER — TELEPHONE (OUTPATIENT)
Dept: CARDIOLOGY | Facility: CLINIC | Age: 52
End: 2022-02-15

## 2022-04-20 ENCOUNTER — OFFICE VISIT (OUTPATIENT)
Dept: CARDIOLOGY | Facility: CLINIC | Age: 52
End: 2022-04-20

## 2022-04-20 VITALS
WEIGHT: 214 LBS | HEART RATE: 64 BPM | SYSTOLIC BLOOD PRESSURE: 118 MMHG | DIASTOLIC BLOOD PRESSURE: 68 MMHG | BODY MASS INDEX: 35.65 KG/M2 | HEIGHT: 65 IN

## 2022-04-20 DIAGNOSIS — I47.1 PAROXYSMAL SVT (SUPRAVENTRICULAR TACHYCARDIA): ICD-10-CM

## 2022-04-20 DIAGNOSIS — U09.9 POST-COVID CHRONIC PALPITATIONS: ICD-10-CM

## 2022-04-20 DIAGNOSIS — R00.2 POST-COVID CHRONIC PALPITATIONS: ICD-10-CM

## 2022-04-20 DIAGNOSIS — I45.6 WPW (WOLFF-PARKINSON-WHITE SYNDROME): Primary | ICD-10-CM

## 2022-04-20 PROCEDURE — 99214 OFFICE O/P EST MOD 30 MIN: CPT | Performed by: INTERNAL MEDICINE

## 2022-04-20 RX ORDER — PROPAFENONE HYDROCHLORIDE 225 MG/1
225 CAPSULE, EXTENDED RELEASE ORAL 2 TIMES DAILY
Qty: 180 CAPSULE | Refills: 3 | Status: SHIPPED | OUTPATIENT
Start: 2022-04-20 | End: 2022-05-11

## 2022-04-20 NOTE — PROGRESS NOTES
Cardiac Electrophysiology Outpatient Follow Up Note            [facility] at [facility]    Follow Up Office Visit      Taylor Infante  [id number]  2022  [unfilled]  [unfilled]    Primary Care Physician: Griffin Guo MD    Referred By: No ref. provider found    Subjective     Chief Complaint:   Diagnoses and all orders for this visit:    1. WPW (Desiree-Parkinson-White syndrome) (Primary)    2. Paroxysmal SVT (supraventricular tachycardia) (HCC)      Chief Complaint   Patient presents with   • Desiree-Parkinson-White Syndrome       History of Present Illness:   Taylor Infante is a 51 y.o. female who presents to my electrophysiology clinic for follow up of above complaints.  Tyalor has had a hard go.  Multiple admissions to the ER for sudden onset rapid palpitations correlate with SVT.    Also has a separate constellation of symptoms of palpitations which are irregular and her home heart rhythm monitor suggest that this is atrial fibrillation and this would be something new for her    Her brother  from complications of cancer and COVID-19.  She reminds me that her son committed suicide some years ago.  Since  all other family members have .  She is grieving significantly for all of her losses that she is encouraged.    She tells me that she is interested ultimately in having her epicardial right anterior lateral Desiree-Parkinson-White accessory pathway ablated however at this time is not ready.  She got COVID earlier this year and is still convalescing from this.  She has flushes and sweats and rapid changes in her blood pressure and heart rate.      Review of Systems:   Constitutional: No fevers or chills, no recent weight gain or weight loss or fatigue  Eyes: No visual loss, blurred vision, double vision, yellow sclerae.  ENT: No headaches, hearing loss, vertigo, congestion or sore throat.   Cardiovascular: Per HPI  Respiratory: No cough or  wheezing, no sputum production, no hematemesis   Gastrointestinal: No abdominal pain, no nausea, vomiting, constipation, diarrhea, melena.   Genitourinary: No dysuria, hematuria or increased frequency.  Musculoskeletal:  No gait disturbance, weakness or joint pain or stiffness  Integumentary: No rashes, urticaria, ulcers or sores.   Neurological: No headache, dizziness, syncope, paralysis, ataxia, no prior CVA/TIA  Psychiatric: No anxiety, or depression  Endocrine: No diaphoresis, cold or heat intolerance. No polyuria or polydipsia.   Hematologic/Lymphatic: No anemia, abnormal bruising or bleeding. No history of DVT/PE.      Past Medical History:   Past Medical History:   Diagnosis Date   • A-fib ([facility])    • Arrhythmia    • Arthritis    • Chronic back pain    • Compression fracture of lumbar vertebra ([facility]) 8/22/2016    L1-L2 w/ Significant kyphotic deformity. She appears to have solid arthrodesis from L2-L5. Flexion and extension films showed very litte movvement at the L1-L2 level.     • Depression    • Diabetes mellitus ([facility])    • Facial sweating    • GERD (gastroesophageal reflux disease)    • Hyperlipidemia    • Hypertension    • Hypothyroidism    • Osteopetrosis    • SVT (supraventricular tachycardia) ([facility])    • Syncope    • Venous insufficiency    • Wears dentures     full top only    • Wears glasses    • WPW (Desiree-Parkinson-White syndrome)        Past Surgical History:   Past Surgical History:   Procedure Laterality Date   • APPENDECTOMY      at age 24   • CARDIAC ELECTROPHYSIOLOGY PROCEDURE N/A 9/2/2021    Procedure: EP/Ablation WPW, Rythmia, GA, hold Flec 3 days;  Surgeon: Alberto Doyle DO;  Location: [facility] INVASIVE LOCATION;  Service: Cardiology;  Laterality: N/A;   • CARDIAC ELECTROPHYSIOLOGY STUDY AND ABLATION  2003    Dr. Mccoy (Dr. Fontenot assisting)   • CHOLECYSTECTOMY  2003   • HYSTERECTOMY  2002    partial    • PAIN PUMP INSERTION/REVISION  2017   • POSTERIOR LUMBAR/THORACIC SPINE FUSION   03/24/2015     l2-l5    • TONSILLECTOMY  1982       Family History:   Family History   Problem Relation Age of Onset   • Diabetes Other    • Alzheimer's disease Other    • Hypertension Other    • Stroke Other    • Valvular heart disease Father    • Cancer Father        Social History:   Social History     Socioeconomic History   • Marital status:    Tobacco Use   • Smoking status: Former Smoker     Packs/day: 2.00     Years: 30.00     Pack years: 60.00     Types: Cigarettes   • Smokeless tobacco: Never Used   Vaping Use   • Vaping Use: Never used   Substance and Sexual Activity   • Alcohol use: Never   • Drug use: Never   • Sexual activity: Defer       Medications:     Current Outpatient Medications:   •  aspirin 81 MG EC tablet, Take 81 mg by mouth Daily., Disp: , Rfl:   •  atorvastatin (LIPITOR) 40 MG tablet, Take 40 mg by mouth Daily., Disp: , Rfl:   •  cetirizine (zyrTEC) 10 MG tablet, Take 10 mg by mouth Daily., Disp: , Rfl:   •  clonazePAM (KlonoPIN) 0.5 MG tablet, Take 0.5 mg by mouth 2 (Two) Times a Day As Needed., Disp: , Rfl:   •  docusate sodium (COLACE) 100 MG capsule, Take 200 mg by mouth Daily., Disp: , Rfl:   •  gabapentin (NEURONTIN) 300 MG capsule, Take 300 mg by mouth 3 (Three) Times a Day., Disp: , Rfl:   •  hydroCHLOROthiazide (HYDRODIURIL) 25 MG tablet, Take 25 mg by mouth Daily., Disp: , Rfl:   •  levalbuterol (XOPENEX HFA) 45 MCG/ACT inhaler, levalbuterol HFA 45 mcg/actuation aerosol inhaler, Disp: , Rfl:   •  losartan (COZAAR) 50 MG tablet, Take 50 mg by mouth Daily., Disp: , Rfl:   •  meclizine (ANTIVERT) 25 MG tablet, Take 25 mg by mouth 2 (Two) Times a Day As Needed., Disp: , Rfl:   •  metFORMIN (GLUCOPHAGE) 500 MG tablet, Take 500 mg by mouth 2 (Two) Times a Day With Meals., Disp: , Rfl:   •  metoprolol succinate XL (TOPROL-XL) 50 MG 24 hr tablet, Take 50 mg by mouth Daily., Disp: , Rfl:   •  metroNIDAZOLE (METROCREAM) 0.75 % cream, Apply  topically to the appropriate area as  "directed 2 (Two) Times a Day., Disp: , Rfl:   •  omeprazole (priLOSEC) 20 MG capsule, Take 20 mg by mouth Daily., Disp: , Rfl:   •  pain patient supplied pump, by Intrathecal route Continuous. Dilaudid/Bupivicaine, Disp: , Rfl:   •  potassium chloride (K-DUR,KLOR-CON) 10 MEQ CR tablet, Take 10 mEq by mouth Daily., Disp: , Rfl:     Allergies:   Allergies   Allergen Reactions   • Bupropion Mental Status Change   • Naltrexone Mental Status Change   • Fentanyl Hives and Itching       Objective   Vital Signs:   Vitals:    04/20/22 1212   BP: 118/68   BP Location: Left arm   Patient Position: Sitting   Pulse: 64   Weight: 97.1 kg (214 lb)   Height: 165.1 cm (65\")       PHYSICAL EXAM  General appearance: Awake, alert, cooperative  Head: Normocephalic, without obvious abnormality, atraumatic  Eyes: Conjunctivae/corneas clear, EOMs intact  Neck: no adenopathy, no carotid bruit, no JVD and thyroid: not enlarged  Lungs: clear to auscultation bilaterally and no rhonchi or crackles\", ' symmetric  Heart: regular rate and rhythm, S1, S2 normal, no murmur, click, rub or gallop  Abdomen: Soft, non-tender, bowel sounds normal,  no organomegaly  Extremities: extremities normal, atraumatic, no cyanosis or edema  Skin: Skin color, turgor normal, no rashes or lesions  Neurologic: Grossly normal     Lab Results   Component Value Date    GLUCOSE 143 (H) 08/30/2021    CALCIUM 9.2 08/30/2021     08/30/2021    K 4.0 08/30/2021    CO2 23.0 08/30/2021     08/30/2021    BUN 5 (L) 08/30/2021    CREATININE 0.49 (L) 08/30/2021    EGFRIFNONA 134 08/30/2021    BCR 10.2 08/30/2021    ANIONGAP 15.0 08/30/2021     Lab Results   Component Value Date    WBC 8.02 08/30/2021    HGB 15.5 08/30/2021    HCT 45.2 08/30/2021    MCV 94.8 08/30/2021     08/30/2021     No results found for: INR, PROTIME  No results found for: TSH, W4PVMNP, K7OVDTD, THYROIDAB    Cardiac Testing:     I personally viewed and interpreted the patient's " EKG/Telemetry/lab data    Procedures    Tobacco Cessation: N/A  Obstructive Sleep Apnea Screening: Completed    Assessment & Plan    Diagnoses and all orders for this visit:    1. WPW (Desiree-Parkinson-White syndrome) (Primary)    2. Paroxysmal SVT (supraventricular tachycardia) (HCC)         Diagnosis Plan   1. WPW (Desiree-Parkinson-White syndrome)   epicardial accessory pathway.    Has failed multiple ablation procedures most recently by myself.    She is interested ultimately in Desiree-Parkinson-White epicardial ablation however at this time she wants to recuperate from COVID infection first I think this is quite reasonable.   2. Paroxysmal SVT (supraventricular tachycardia) (HCC)  Significant increase in burden of symptoms.  Her consumer grade electronic heart rhythm monitor at home suggest atrial relation.  She feels like this is different.    At this point I think it is worthwhile reassessing with a ambulatory heart rhythm monitor    Zio patch for 3 weeks.    We will assume that this is A. fib and I will start her on apixaban 5 mg twice daily and I will stop her aspirin.    If indeed this is atrial fibrillation this may be a result of a COVID infection alternatively may be partly due to her Desiree-Parkinson-White syndrome.    After we documented the episode on an ambulatory heart rhythm monitor she will start Rythmol SR to 25 mg twice daily    Cardiac MRI will be obtained.  We will assess for left atrial size morphology scar burden throughout the ventricles    Follow-up with me in 8 weeks.     Body mass index is 35.61 kg/m².    I spent   39 minutes in consultation with this patient which included more than 65% of this time in direct face-to-face counseling, physical examination and discussion of my assessment and findings and shared decision making with the patient.  The remainder of the time not spent face to face was performing one, some or all of the following actions:  preparing to see this patient ( eg.  Review of tests),  ordering medications, tests or procedures ), care coordination, discussion of the plan with other healthcare providers, documenting clinical information in Epic well as independently interpreting results and communicating results to patient, family and or caregiver.  All time noted occurred on the date of service.    Follow Up:       Thank you for allowing me to participate in the care of your patient. Please to not hesitate to contact me with additional questions or concerns.      Alberto Doyle, DO, FACC, RS  Cardiac Electrophysiologist  [facility] / [facility]

## 2022-04-21 ENCOUNTER — TELEPHONE (OUTPATIENT)
Dept: CARDIOLOGY | Facility: CLINIC | Age: 52
End: 2022-04-21

## 2022-04-21 NOTE — TELEPHONE ENCOUNTER
I submitted a PA for Eliquis 5 mg BID through [employer].          AZRA MACEDO (Teague: [id number])    Your information has been submitted to [facility]. To check for an updated outcome later, reopen this PA request from your dashboard.    If [facility] has not responded to your request within 24 hours, contact [facility] at [phone]. If you think there may be a problem with your PA request, use our live chat feature at the bottom right.

## 2022-04-25 ENCOUNTER — TELEPHONE (OUTPATIENT)
Dept: CARDIOLOGY | Facility: CLINIC | Age: 52
End: 2022-04-25

## 2022-04-25 NOTE — TELEPHONE ENCOUNTER
[facility] is requesting clearance for the patient to have her pain pump repositioned on 5/19/22. They would like to hold Eliquis 4 days prior.              Dr. Ric Kaufman  (P)[phone]  (F)[phone]

## 2022-05-10 ENCOUNTER — TELEPHONE (OUTPATIENT)
Dept: CARDIOLOGY | Facility: CLINIC | Age: 52
End: 2022-05-10

## 2022-05-10 NOTE — TELEPHONE ENCOUNTER
Patient was started on propafenone 225 mg BID on 4/20/22. Since then she has been experiencing more frequent palpitations and episodes of tachycardia. She felt so bad yesterday that she went to the ER at [facility] (I requested the records for you to review). She states that she had to take an extra 50 mg of Metoprolol this morning to help with the palpitations. While we were on the phone her BP was 155/96 and her HR was 62. She thinks that medication is making her condition worse.

## 2022-06-09 ENCOUNTER — HOSPITAL ENCOUNTER (OUTPATIENT)
Dept: MRI IMAGING | Facility: HOSPITAL | Age: 52
End: 2022-06-09

## 2022-06-15 ENCOUNTER — OFFICE VISIT (OUTPATIENT)
Dept: CARDIOLOGY | Facility: CLINIC | Age: 52
End: 2022-06-15

## 2022-06-15 VITALS
BODY MASS INDEX: 35.32 KG/M2 | HEIGHT: 65 IN | OXYGEN SATURATION: 96 % | DIASTOLIC BLOOD PRESSURE: 92 MMHG | SYSTOLIC BLOOD PRESSURE: 146 MMHG | HEART RATE: 73 BPM | WEIGHT: 212 LBS

## 2022-06-15 DIAGNOSIS — I47.1 PAROXYSMAL SVT (SUPRAVENTRICULAR TACHYCARDIA): ICD-10-CM

## 2022-06-15 DIAGNOSIS — R00.2 PALPITATIONS: ICD-10-CM

## 2022-06-15 DIAGNOSIS — I45.6 WPW (WOLFF-PARKINSON-WHITE SYNDROME): Primary | ICD-10-CM

## 2022-06-15 PROCEDURE — 99214 OFFICE O/P EST MOD 30 MIN: CPT | Performed by: INTERNAL MEDICINE

## 2022-06-15 RX ORDER — NITROFURANTOIN 25; 75 MG/1; MG/1
100 CAPSULE ORAL 2 TIMES DAILY
Qty: 14 CAPSULE | Refills: 3 | Status: SHIPPED | OUTPATIENT
Start: 2022-06-15 | End: 2022-06-22

## 2022-06-15 NOTE — PROGRESS NOTES
Cardiac Electrophysiology Outpatient Follow Up Note            [facility] at [facility]    Follow Up Office Visit      Taylor Infante  [id number]  06/15/2022  [unfilled]  [unfilled]    Primary Care Physician: Griffin Guo MD    Referred By: No ref. provider found    Subjective     Chief Complaint:   Diagnoses and all orders for this visit:    1. WPW (Desiree-Parkinson-White syndrome) (Primary)    2. Palpitations    3. Paroxysmal SVT (supraventricular tachycardia) (HCC)      Chief Complaint   Patient presents with   • Desiree-Parkinson-White Syndrome       History of Present Illness:   Taylor Infante is a 51 y.o. female who presents to my electrophysiology clinic for follow up of recurrent episodes of SVT.  Taylor is anticipating back surgery.  This was delayed by the anesthesia team as she had not had her MRI yet.  She really has quite a bit of pain and discomfort at the spinal implant implant.    No chest pain nausea vomit fevers or chills.  Repeated episodes to the ER.  5 this year so far.  All of her SVT.    Review of Systems:   Constitutional: No fevers or chills, no recent weight gain or weight loss or fatigue  Eyes: No visual loss, blurred vision, double vision, yellow sclerae.  ENT: No headaches, hearing loss, vertigo, congestion or sore throat.   Cardiovascular: Per HPI  Respiratory: No cough or wheezing, no sputum production, no hematemesis   Gastrointestinal: No abdominal pain, no nausea, vomiting, constipation, diarrhea, melena.   Genitourinary: No dysuria, hematuria or increased frequency.  Musculoskeletal:  No gait disturbance, weakness or joint pain or stiffness  Integumentary: No rashes, urticaria, ulcers or sores.   Neurological: No headache, dizziness, syncope, paralysis, ataxia, no prior CVA/TIA  Psychiatric: No anxiety, or depression  Endocrine: No diaphoresis, cold or heat intolerance. No polyuria or polydipsia.   Hematologic/Lymphatic: No  anemia, abnormal bruising or bleeding. No history of DVT/PE.'    Past Medical History:   Past Medical History:   Diagnosis Date   • A-fib ([facility])    • Arrhythmia    • Arthritis    • Chronic back pain    • Compression fracture of lumbar vertebra ([facility]) 8/22/2016    L1-L2 w/ Significant kyphotic deformity. She appears to have solid arthrodesis from L2-L5. Flexion and extension films showed very litte movvement at the L1-L2 level.     • Depression    • Diabetes mellitus ([facility])    • Facial sweating    • GERD (gastroesophageal reflux disease)    • Hyperlipidemia    • Hypertension    • Hypothyroidism    • Osteopetrosis    • SVT (supraventricular tachycardia) ([facility])    • Syncope    • Venous insufficiency    • Wears dentures     full top only    • Wears glasses    • WPW (Desiree-Parkinson-White syndrome)        Past Surgical History:   Past Surgical History:   Procedure Laterality Date   • APPENDECTOMY      at age 24   • CARDIAC ELECTROPHYSIOLOGY PROCEDURE N/A 9/2/2021    Procedure: EP/Ablation WPW, [place]thmia, [place], hold Flec 3 days;  Surgeon: Alberto Doyle DO;  Location: [facility] EP INVASIVE LOCATION;  Service: Cardiology;  Laterality: N/A;   • CARDIAC ELECTROPHYSIOLOGY STUDY AND ABLATION  2003    Dr. Mccoy (Dr. Fontenot assisting)   • CHOLECYSTECTOMY  2003   • HYSTERECTOMY  2002    partial    • PAIN PUMP INSERTION/REVISION  2017   • POSTERIOR LUMBAR/THORACIC SPINE FUSION  03/24/2015     l2-l5    • TONSILLECTOMY  1982       Family History:   Family History   Problem Relation Age of Onset   • Diabetes Other    • Alzheimer's disease Other    • Hypertension Other    • Stroke Other    • Valvular heart disease Father    • Cancer Father        Social History:   Social History     Socioeconomic History   • Marital status:    Tobacco Use   • Smoking status: Former Smoker     Packs/day: 2.00     Years: 30.00     Pack years: 60.00     Types: Cigarettes   • Smokeless tobacco: Never Used   Vaping Use   • Vaping Use: Never used    Substance and Sexual Activity   • Alcohol use: Never   • Drug use: Never   • Sexual activity: Defer       Medications:     Current Outpatient Medications:   •  apixaban (ELIQUIS) 5 MG tablet tablet, Take 1 tablet by mouth Every 12 (Twelve) Hours for 180 days., Disp: 360 tablet, Rfl: 0  •  atorvastatin (LIPITOR) 40 MG tablet, Take 40 mg by mouth Daily., Disp: , Rfl:   •  cetirizine (zyrTEC) 10 MG tablet, Take 10 mg by mouth Daily., Disp: , Rfl:   •  clonazePAM (KlonoPIN) 0.5 MG tablet, Take 0.5 mg by mouth 2 (Two) Times a Day As Needed., Disp: , Rfl:   •  docusate sodium (COLACE) 100 MG capsule, Take 200 mg by mouth Daily., Disp: , Rfl:   •  gabapentin (NEURONTIN) 300 MG capsule, Take 300 mg by mouth 3 (Three) Times a Day., Disp: , Rfl:   •  hydroCHLOROthiazide (HYDRODIURIL) 25 MG tablet, Take 25 mg by mouth Daily., Disp: , Rfl:   •  levalbuterol (XOPENEX HFA) 45 MCG/ACT inhaler, levalbuterol HFA 45 mcg/actuation aerosol inhaler, Disp: , Rfl:   •  losartan (COZAAR) 50 MG tablet, Take 50 mg by mouth Daily., Disp: , Rfl:   •  meclizine (ANTIVERT) 25 MG tablet, Take 25 mg by mouth 2 (Two) Times a Day As Needed., Disp: , Rfl:   •  metFORMIN (GLUCOPHAGE) 500 MG tablet, Take 500 mg by mouth 2 (Two) Times a Day With Meals., Disp: , Rfl:   •  metoprolol succinate XL (TOPROL-XL) 50 MG 24 hr tablet, Take 50 mg by mouth 2 (Two) Times a Day., Disp: , Rfl:   •  metroNIDAZOLE (METROCREAM) 0.75 % cream, Apply  topically to the appropriate area as directed 2 (Two) Times a Day., Disp: , Rfl:   •  omeprazole (priLOSEC) 20 MG capsule, Take 20 mg by mouth Daily., Disp: , Rfl:   •  pain patient supplied pump, by Intrathecal route Continuous. Dilaudid/Bupivicaine, Disp: , Rfl:   •  potassium chloride (K-DUR,KLOR-CON) 10 MEQ CR tablet, Take 10 mEq by mouth Daily., Disp: , Rfl:     Allergies:   Allergies   Allergen Reactions   • Bupropion Mental Status Change   • Naltrexone Mental Status Change   • Fentanyl Hives and Itching  "      Objective   Vital Signs:   Vitals:    06/15/22 1010   BP: 146/92   BP Location: Right arm   Patient Position: Sitting   Pulse: 73   SpO2: 96%   Weight: 96.2 kg (212 lb)   Height: 165.1 cm (65\")       PHYSICAL EXAM  General appearance: Awake, alert, cooperative  Head: Normocephalic, without obvious abnormality, atraumatic  Eyes: Conjunctivae/corneas clear, EOMs intact  Neck: no adenopathy, no carotid bruit, no JVD and thyroid: not enlarged  Lungs: clear to auscultation bilaterally and no rhonchi or crackles\", ' symmetric  Heart: regular rate and rhythm, S1, S2 normal, no murmur, click, rub or gallop  Abdomen: Soft, non-tender, bowel sounds normal,  no organomegaly  Extremities: extremities normal, atraumatic, no cyanosis or edema  Skin: Skin color, turgor normal, no rashes or lesions  Neurologic: Grossly normal     Lab Results   Component Value Date    GLUCOSE 143 (H) 08/30/2021    CALCIUM 9.2 08/30/2021     08/30/2021    K 4.0 08/30/2021    CO2 23.0 08/30/2021     08/30/2021    BUN 5 (L) 08/30/2021    CREATININE 0.49 (L) 08/30/2021    EGFRIFNONA 134 08/30/2021    BCR 10.2 08/30/2021    ANIONGAP 15.0 08/30/2021     Lab Results   Component Value Date    WBC 8.02 08/30/2021    HGB 15.5 08/30/2021    HCT 45.2 08/30/2021    MCV 94.8 08/30/2021     08/30/2021     No results found for: INR, PROTIME  No results found for: TSH, Z4HCPUF, L8OQFVR, THYROIDAB    Cardiac Testing:     I personally viewed and interpreted the patient's EKG/Telemetry/lab data    Procedures    Tobacco Cessation: N/A  Obstructive Sleep Apnea Screening: Completed    Assessment & Plan    Diagnoses and all orders for this visit:    1. WPW (Desiree-Parkinson-White syndrome) (Primary)    2. Palpitations    3. Paroxysmal SVT (supraventricular tachycardia) (HCC)         Diagnosis Plan   1. WPW (Desiree-Parkinson-White syndrome)   right anterior lateral epicardial accessory pathway.  Lifelong symptoms.  Recurrent episodes of SVT.    Await " a cardiac MRI which we did order previously and is now scheduled for July.  I will see her in August after her procedure.    Additionally she is also scheduled to have her pain pump at the lower aspect of her right lumbar region replaced or revised at least.  This is causing her significant pain.    Her procedure was delayed because of trepidation from anesthesia regarding her pending MRI.  Her perioperative risk is low.  She should proceed.  The cardiac MRI results are really not going to substantively change her perioperative management.    In the meantime, continue anticoagulation for paroxysmal atrial fibrillation which may or may not be associated with her preexcitation.    Continue Toprol-XL but increase it to 3 times a day.    Hopefully no more visits to the ER for sustained SVT.    Looking at potentially ablating her epicardial accessory pathway with an epicardial ablation procedure in the fall.  I will see her in August and we will review details.   2. Palpitations   as above.   3. Paroxysmal SVT (supraventricular tachycardia) (HCC)   as above.     Body mass index is 35.28 kg/m².    I spent 43 minutes in consultation with this patient which included more than 65% of this time in direct face-to-face counseling, physical examination and discussion of my assessment and findings and shared decision making with the patient.  The remainder of the time not spent face to face was performing one, some or all of the following actions:  preparing to see this patient ( eg. Review of tests),  ordering medications, tests or procedures ), care coordination, discussion of the plan with other healthcare providers, documenting clinical information in Epic well as independently interpreting results and communicating results to patient, family and or caregiver.  All time noted occurred on the date of service.    Follow Up:       Thank you for allowing me to participate in the care of your patient. Please to not hesitate to  contact me with additional questions or concerns.      Alberto Doyle, DO, FACC, RS  Cardiac Electrophysiologist  [facility] / [facility]

## 2022-06-16 RX ORDER — METOPROLOL SUCCINATE 50 MG/1
50 TABLET, EXTENDED RELEASE ORAL 3 TIMES DAILY
Qty: 90 TABLET | Refills: 5 | Status: SHIPPED | OUTPATIENT
Start: 2022-06-16 | End: 2022-12-22

## 2022-07-06 ENCOUNTER — HOSPITAL ENCOUNTER (OUTPATIENT)
Dept: MRI IMAGING | Facility: HOSPITAL | Age: 52
Discharge: HOME | End: 2022-07-06

## 2022-07-06 DIAGNOSIS — I47.1 PAROXYSMAL SVT (SUPRAVENTRICULAR TACHYCARDIA): ICD-10-CM

## 2022-07-06 DIAGNOSIS — U09.9 POST-COVID CHRONIC PALPITATIONS: ICD-10-CM

## 2022-07-06 DIAGNOSIS — R00.2 POST-COVID CHRONIC PALPITATIONS: ICD-10-CM

## 2022-07-06 DIAGNOSIS — I45.6 WPW (WOLFF-PARKINSON-WHITE SYNDROME): ICD-10-CM

## 2022-07-18 ENCOUNTER — HOSPITAL ENCOUNTER (OUTPATIENT)
Dept: MRI IMAGING | Facility: HOSPITAL | Age: 52
Discharge: HOME | End: 2022-07-18
Admitting: INTERNAL MEDICINE

## 2022-07-18 DIAGNOSIS — R00.2 POST-COVID CHRONIC PALPITATIONS: ICD-10-CM

## 2022-07-18 DIAGNOSIS — I45.6 WPW (WOLFF-PARKINSON-WHITE SYNDROME): ICD-10-CM

## 2022-07-18 DIAGNOSIS — I47.1 PAROXYSMAL SVT (SUPRAVENTRICULAR TACHYCARDIA): ICD-10-CM

## 2022-07-18 DIAGNOSIS — U09.9 POST-COVID CHRONIC PALPITATIONS: ICD-10-CM

## 2022-07-18 PROCEDURE — 75557 CARDIAC MRI FOR MORPH: CPT

## 2022-07-18 PROCEDURE — 82565 ASSAY OF CREATININE: CPT

## 2022-07-18 PROCEDURE — 75561 CARDIAC MRI FOR MORPH W/DYE: CPT | Performed by: INTERNAL MEDICINE

## 2022-07-28 LAB — CREAT BLDA-MCNC: 0.5 MG/DL (ref 0.6–1.3)

## 2022-09-21 ENCOUNTER — OFFICE VISIT (OUTPATIENT)
Dept: CARDIOLOGY | Facility: CLINIC | Age: 52
End: 2022-09-21

## 2022-09-21 VITALS
HEIGHT: 65 IN | HEART RATE: 65 BPM | OXYGEN SATURATION: 97 % | BODY MASS INDEX: 32.65 KG/M2 | WEIGHT: 196 LBS | SYSTOLIC BLOOD PRESSURE: 132 MMHG | DIASTOLIC BLOOD PRESSURE: 84 MMHG

## 2022-09-21 DIAGNOSIS — I47.1 PAROXYSMAL SVT (SUPRAVENTRICULAR TACHYCARDIA): ICD-10-CM

## 2022-09-21 DIAGNOSIS — I45.6 WPW (WOLFF-PARKINSON-WHITE SYNDROME): Primary | ICD-10-CM

## 2022-09-21 DIAGNOSIS — I48.0 PAF (PAROXYSMAL ATRIAL FIBRILLATION): ICD-10-CM

## 2022-09-21 PROCEDURE — 99214 OFFICE O/P EST MOD 30 MIN: CPT | Performed by: INTERNAL MEDICINE

## 2022-09-21 NOTE — PROGRESS NOTES
Cardiac Electrophysiology Outpatient Follow Up Note            [facility] at     Follow Up Office Visit      Taylor Infante  [id number]  09/21/2022  [unfilled]  [unfilled]    Primary Care Physician: Griffin Guo MD    Referred By: No ref. provider found    Subjective     Chief Complaint:   Diagnoses and all orders for this visit:    1. WPW (Desiree-Parkinson-White syndrome) (Primary)    2. Paroxysmal SVT (supraventricular tachycardia) (HCC)    3. PAF (paroxysmal atrial fibrillation) (HCC)      Chief Complaint   Patient presents with   • Desiree-Parkinson-White Syndrome       History of Present Illness:   Taylor Infante is a 52 y.o. female who presents to my electrophysiology clinic for follow up of the above complaints.  Taylor is actually doing really well from a heart perspective.  Her cardiac MRI was unremarkable.    She is still not had her pain pump surgical revision/replacement.  She anticipates having this in the next several months still.  There have been some scheduling problems with her surgeon    He has no significant palpitations.  She really actually says that she has had no palpitations since of last seen her.  She is tolerating blood thinner well.  No chest pain nausea vomit fevers chills certainly no syncope or presyncope..      Review of Systems:   Constitutional: No fevers or chills, no recent weight gain or weight loss or fatigue  Eyes: No visual loss, blurred vision, double vision, yellow sclerae.  ENT: No headaches, hearing loss, vertigo, congestion or sore throat.   Cardiovascular: Per HPI  Respiratory: No cough or wheezing, no sputum production, no hematemesis   Gastrointestinal: No abdominal pain, no nausea, vomiting, constipation, diarrhea, melena.   Genitourinary: No dysuria, hematuria or increased frequency.  Musculoskeletal:  No gait disturbance, weakness or joint pain or stiffness  Integumentary: No rashes, urticaria, ulcers  or sores.   Neurological: No headache, dizziness, syncope, paralysis, ataxia, no prior CVA/TIA  Psychiatric: No anxiety, or depression  Endocrine: No diaphoresis, cold or heat intolerance. No polyuria or polydipsia.   Hematologic/Lymphatic: No anemia, abnormal bruising or bleeding. No history of DVT/PE.      Past Medical History:   Past Medical History:   Diagnosis Date   • A-fib (Formerly [facility])    • Arrhythmia    • Arthritis    • Chronic back pain    • Compression fracture of lumbar vertebra (Formerly [facility]) 8/22/2016    L1-L2 w/ Significant kyphotic deformity. She appears to have solid arthrodesis from L2-L5. Flexion and extension films showed very litte movvement at the L1-L2 level.     • Depression    • Diabetes mellitus (Formerly [facility])    • Facial sweating    • GERD (gastroesophageal reflux disease)    • Hyperlipidemia    • Hypertension    • Hypothyroidism    • Osteopetrosis    • SVT (supraventricular tachycardia) (Formerly [facility])    • Syncope    • Venous insufficiency    • Wears dentures     full top only    • Wears glasses    • WPW (Desiree-Parkinson-White syndrome)        Past Surgical History:   Past Surgical History:   Procedure Laterality Date   • APPENDECTOMY      at age 24   • CARDIAC ELECTROPHYSIOLOGY PROCEDURE N/A 9/2/2021    Procedure: EP/Ablation WPW, Rythmia, [place], hold Flec 3 days;  Surgeon: Alberto Doyle DO;  Location: [facility] EP INVASIVE LOCATION;  Service: Cardiology;  Laterality: N/A;   • CARDIAC ELECTROPHYSIOLOGY STUDY AND ABLATION  2003    Dr. Mccoy (Dr. Fontenot assisting)   • CHOLECYSTECTOMY  2003   • HYSTERECTOMY  2002    partial    • PAIN PUMP INSERTION/REVISION  2017   • POSTERIOR LUMBAR/THORACIC SPINE FUSION  03/24/2015     l2-l5    • TONSILLECTOMY  1982       Family History:   Family History   Problem Relation Age of Onset   • Diabetes Other    • Alzheimer's disease Other    • Hypertension Other    • Stroke Other    • Valvular heart disease Father    • Cancer Father        Social History:   Social History     Socioeconomic  History   • Marital status:    Tobacco Use   • Smoking status: Former Smoker     Packs/day: 2.00     Years: 30.00     Pack years: 60.00     Types: Cigarettes   • Smokeless tobacco: Never Used   Vaping Use   • Vaping Use: Never used   Substance and Sexual Activity   • Alcohol use: Never   • Drug use: Never   • Sexual activity: Defer       Medications:     Current Outpatient Medications:   •  apixaban (ELIQUIS) 5 MG tablet tablet, Take 1 tablet by mouth Every 12 (Twelve) Hours for 180 days., Disp: 360 tablet, Rfl: 0  •  atorvastatin (LIPITOR) 40 MG tablet, Take 40 mg by mouth Daily., Disp: , Rfl:   •  cetirizine (zyrTEC) 10 MG tablet, Take 10 mg by mouth Daily., Disp: , Rfl:   •  clonazePAM (KlonoPIN) 0.5 MG tablet, Take 0.5 mg by mouth 2 (Two) Times a Day As Needed., Disp: , Rfl:   •  docusate sodium (COLACE) 100 MG capsule, Take 200 mg by mouth Daily., Disp: , Rfl:   •  gabapentin (NEURONTIN) 300 MG capsule, Take 300 mg by mouth 3 (Three) Times a Day., Disp: , Rfl:   •  hydroCHLOROthiazide (HYDRODIURIL) 25 MG tablet, Take 25 mg by mouth Daily., Disp: , Rfl:   •  levalbuterol (XOPENEX HFA) 45 MCG/ACT inhaler, levalbuterol HFA 45 mcg/actuation aerosol inhaler, Disp: , Rfl:   •  losartan (COZAAR) 50 MG tablet, Take 50 mg by mouth Daily., Disp: , Rfl:   •  meclizine (ANTIVERT) 25 MG tablet, Take 25 mg by mouth 2 (Two) Times a Day As Needed., Disp: , Rfl:   •  metFORMIN (GLUCOPHAGE) 500 MG tablet, Take 500 mg by mouth 2 (Two) Times a Day With Meals., Disp: , Rfl:   •  metoprolol succinate XL (TOPROL-XL) 50 MG 24 hr tablet, Take 1 tablet by mouth 3 (Three) Times a Day., Disp: 90 tablet, Rfl: 5  •  metroNIDAZOLE (METROCREAM) 0.75 % cream, Apply  topically to the appropriate area as directed 2 (Two) Times a Day., Disp: , Rfl:   •  omeprazole (priLOSEC) 20 MG capsule, Take 20 mg by mouth Daily., Disp: , Rfl:   •  pain patient supplied pump, by Intrathecal route Continuous. Fent/Bupivicaine, Disp: , Rfl:   •  potassium  "chloride (K-DUR,KLOR-CON) 10 MEQ CR tablet, Take 10 mEq by mouth Daily., Disp: , Rfl:     Allergies:   Allergies   Allergen Reactions   • Bupropion Mental Status Change   • Naltrexone Mental Status Change   • Fentanyl Hives and Itching       Objective   Vital Signs:   Vitals:    09/21/22 1317   BP: 132/84   BP Location: Right arm   Patient Position: Sitting   Pulse: 65   SpO2: 97%   Weight: 88.9 kg (196 lb)   Height: 165.1 cm (65\")       PHYSICAL EXAM  General appearance: Awake, alert, cooperative  Head: Normocephalic, without obvious abnormality, atraumatic  Eyes: Conjunctivae/corneas clear, EOMs intact  Neck: no adenopathy, no carotid bruit, no JVD and thyroid: not enlarged  Lungs: clear to auscultation bilaterally and no rhonchi or crackles\", ' symmetric  Heart: regular rate and rhythm, S1, S2 normal, no murmur, click, rub or gallop  Abdomen: Soft, non-tender, bowel sounds normal,  no organomegaly  Extremities: extremities normal, atraumatic, no cyanosis or edema  Skin: Skin color, turgor normal, no rashes or lesions  Neurologic: Grossly normal     Lab Results   Component Value Date    GLUCOSE 143 (H) 08/30/2021    CALCIUM 9.2 08/30/2021     08/30/2021    K 4.0 08/30/2021    CO2 23.0 08/30/2021     08/30/2021    BUN 5 (L) 08/30/2021    CREATININE 0.50 (L) 07/18/2022    EGFRIFNONA 134 08/30/2021    BCR 10.2 08/30/2021    ANIONGAP 15.0 08/30/2021     Lab Results   Component Value Date    WBC 8.02 08/30/2021    HGB 15.5 08/30/2021    HCT 45.2 08/30/2021    MCV 94.8 08/30/2021     08/30/2021     No results found for: INR, PROTIME  No results found for: TSH, Y8XVMQA, P9EEEOS, THYROIDAB    Cardiac Testing:     I personally viewed and interpreted the patient's EKG/Telemetry/lab data    Procedures    Tobacco Cessation: N/A  Obstructive Sleep Apnea Screening: Completed    Assessment & Plan    Diagnoses and all orders for this visit:    1. WPHIREN (DaiParkinson-White syndrome) (Primary)    2. Paroxysmal " SVT (supraventricular tachycardia) (Formerly [facility])    3. PAF (paroxysmal atrial fibrillation) (Formerly [facility])         Diagnosis Plan   1. WPW (Desiree-Parkinson-White syndrome)   epicardial right anterior accessory pathway.  3 prior ablations were unsuccessful including 1 performed by myself.    The option of an epicardial ablation remains on the table for her.    Her symptoms at this point however are quite mild but really absent.  At this point the risk-benefit profile favors a conservative wait-and-see approach.    Will readdress in 6 months time.    At this point continue conservative therapy.    Effective refractory period of AV conduction and the sensory pathway in her left EP procedure with me suggested that this was a low risk pathway.    Concomitant atrial fibrillation I am comfortable with her AV node blocking medications.       2. Paroxysmal SVT (supraventricular tachycardia) (Formerly [facility])   really no symptoms at this point.   3. PAF (paroxysmal atrial fibrillation) (Formerly [facility])   unclear her A. fib is a result of the accessory pathway of preexcitation or if she has A. fib via a independent mechanism.    Regardless anticoagulation is recommended and she is tolerating apixaban quite well.    Follow-up with me in 6 months.     Body mass index is 32.62 kg/m².    I spent 45 minutes in consultation with this patient which included more than 65% of this time in direct face-to-face counseling, physical examination and discussion of my assessment and findings and shared decision making with the patient.  The remainder of the time not spent face to face was performing one, some or all of the following actions:  preparing to see this patient ( eg. Review of tests),  ordering medications, tests or procedures ), care coordination, discussion of the plan with other healthcare providers, documenting clinical information in Epic well as independently interpreting results and communicating results to patient, family and or caregiver.  All time noted  occurred on the date of service.    Follow Up:       Thank you for allowing me to participate in the care of your patient. Please to not hesitate to contact me with additional questions or concerns.      Alberto Doyle DO, FACC, [facility]  Cardiac Electrophysiologist  [facility] / [facility]

## 2022-12-22 RX ORDER — METOPROLOL SUCCINATE 50 MG/1
50 TABLET, EXTENDED RELEASE ORAL 3 TIMES DAILY
Qty: 90 TABLET | Refills: 6 | Status: SHIPPED | OUTPATIENT
Start: 2022-12-22

## 2023-10-06 ENCOUNTER — TELEPHONE (OUTPATIENT)
Dept: CARDIOLOGY | Facility: CLINIC | Age: 53
End: 2023-10-06
Payer: COMMERCIAL

## 2023-10-06 NOTE — TELEPHONE ENCOUNTER
Dr. Ric Marsh with [facility] is requesting the patient hold their eliquis 4 days prior to a Neurostimulator Trial and 7 days during trial.     Patient has a history of PAF, WPW, and Paroxysmal SVT       Please advise.

## 2023-12-20 ENCOUNTER — OFFICE VISIT (OUTPATIENT)
Dept: CARDIOLOGY | Facility: CLINIC | Age: 53
End: 2023-12-20
Payer: COMMERCIAL

## 2023-12-20 VITALS
BODY MASS INDEX: 31.99 KG/M2 | HEART RATE: 68 BPM | WEIGHT: 192 LBS | DIASTOLIC BLOOD PRESSURE: 78 MMHG | HEIGHT: 65 IN | OXYGEN SATURATION: 98 % | SYSTOLIC BLOOD PRESSURE: 120 MMHG

## 2023-12-20 DIAGNOSIS — I47.10 PAROXYSMAL SVT (SUPRAVENTRICULAR TACHYCARDIA): Primary | ICD-10-CM

## 2023-12-20 DIAGNOSIS — Z79.01 CHRONIC ANTICOAGULATION: ICD-10-CM

## 2023-12-20 DIAGNOSIS — I48.0 PAF (PAROXYSMAL ATRIAL FIBRILLATION): ICD-10-CM

## 2023-12-20 DIAGNOSIS — I45.6 WPW (WOLFF-PARKINSON-WHITE SYNDROME): ICD-10-CM

## 2023-12-20 PROBLEM — R00.2 PALPITATIONS: Status: RESOLVED | Noted: 2021-07-21 | Resolved: 2023-12-20

## 2023-12-20 RX ORDER — ONDANSETRON 4 MG/1
4 TABLET, ORALLY DISINTEGRATING ORAL ONCE
COMMUNITY
Start: 2023-10-24

## 2023-12-20 RX ORDER — HYDROCHLOROTHIAZIDE 25 MG/1
25 TABLET ORAL DAILY
Qty: 90 TABLET | Refills: 3 | Status: SHIPPED | OUTPATIENT
Start: 2023-12-20 | End: 2024-12-19

## 2023-12-20 RX ORDER — CYCLOBENZAPRINE HCL 5 MG
5 TABLET ORAL 2 TIMES DAILY PRN
COMMUNITY
Start: 2023-12-07

## 2023-12-20 RX ORDER — ESTRADIOL 0.1 MG/G
2 CREAM VAGINAL DAILY
COMMUNITY
Start: 2023-11-30

## 2023-12-20 RX ORDER — DICLOFENAC SODIUM 75 MG/1
75 TABLET, DELAYED RELEASE ORAL DAILY PRN
COMMUNITY
Start: 2023-11-30

## 2023-12-20 RX ORDER — APIXABAN 5 MG/1
5 TABLET, FILM COATED ORAL EVERY 12 HOURS SCHEDULED
COMMUNITY
Start: 2023-11-30

## 2023-12-20 RX ORDER — POTASSIUM CHLORIDE 750 MG/1
10 TABLET, FILM COATED, EXTENDED RELEASE ORAL DAILY
Qty: 90 TABLET | Refills: 3 | Status: SHIPPED | OUTPATIENT
Start: 2023-12-20

## 2023-12-20 RX ORDER — HYDROCODONE BITARTRATE AND ACETAMINOPHEN 7.5; 325 MG/1; MG/1
1 TABLET ORAL EVERY 6 HOURS PRN
COMMUNITY
Start: 2023-12-12

## 2023-12-20 NOTE — PROGRESS NOTES
Cardiac Electrophysiology Outpatient Follow Up Note            [facility] at [facility]    Follow Up Office Visit      Taylor Infante  [id number]  12/20/2023  [unfilled]  [unfilled]    Primary Care Physician: Griffin Guo MD    Referred By: No ref. provider found    Subjective     Chief Complaint:   Diagnoses and all orders for this visit:    1. Paroxysmal SVT (supraventricular tachycardia) (Primary)    2. WPW (Desiree-Parkinson-White syndrome)    3. PAF (paroxysmal atrial fibrillation)    4. Chronic anticoagulation      Chief Complaint   Patient presents with    Desiree-Parkinson-White Syndrome       History of Present Illness:   Taylor Infante is a 53 y.o. female who presents to my electrophysiology clinic for follow up of recurrent SVT.  Lots of trepidation about an upcoming back surgery.  She states she does not want the surgery.  She is not sure if it will help her.  She has had bad experiences with pain stimulator surgeries previously.      Past Medical History:   Past Medical History:   Diagnosis Date    A-fib     Arrhythmia     Arthritis     Chronic back pain     Compression fracture of lumbar vertebra 8/22/2016    L1-L2 w/ Significant kyphotic deformity. She appears to have solid arthrodesis from L2-L5. Flexion and extension films showed very litte movvement at the L1-L2 level.      Depression     Diabetes mellitus     Facial sweating     GERD (gastroesophageal reflux disease)     Hyperlipidemia     Hypertension     Hypothyroidism     Osteopetrosis     SVT (supraventricular tachycardia)     Syncope     Venous insufficiency     Wears dentures     full top only     Wears glasses     WPW (Desiree-Parkinson-White syndrome)        Past Surgical History:   Past Surgical History:   Procedure Laterality Date    APPENDECTOMY      at age 24    CARDIAC ELECTROPHYSIOLOGY PROCEDURE N/A 9/2/2021    Procedure: EP/Ablation WPW, [place]thmia, [place], hold Flec 3 days;  Surgeon:  Alberto Doyle, DO;  Location: [facility] INVASIVE LOCATION;  Service: Cardiology;  Laterality: N/A;    CARDIAC ELECTROPHYSIOLOGY STUDY AND ABLATION  2003    Dr. Mccoy (Dr. Fontenot assisting)    CHOLECYSTECTOMY  2003    HYSTERECTOMY  2002    partial     PAIN PUMP INSERTION/REVISION  2017    POSTERIOR LUMBAR/THORACIC SPINE FUSION  03/24/2015     l2-l5     TONSILLECTOMY  1982       Family History:   Family History   Problem Relation Age of Onset    Diabetes Other     Alzheimer's disease Other     Hypertension Other     Stroke Other     Valvular heart disease Father     Cancer Father        Social History:   Social History     Socioeconomic History    Marital status:    Tobacco Use    Smoking status: Former     Packs/day: 2.00     Years: 30.00     Additional pack years: 0.00     Total pack years: 60.00     Types: Cigarettes    Smokeless tobacco: Never   Vaping Use    Vaping Use: Never used   Substance and Sexual Activity    Alcohol use: Never    Drug use: Never    Sexual activity: Defer       Medications:     Current Outpatient Medications:     atorvastatin (LIPITOR) 40 MG tablet, Take 1 tablet by mouth Daily., Disp: , Rfl:     cetirizine (zyrTEC) 10 MG tablet, Take 1 tablet by mouth Daily., Disp: , Rfl:     clonazePAM (KlonoPIN) 0.5 MG tablet, Take 1 tablet by mouth 2 (Two) Times a Day As Needed., Disp: , Rfl:     cyclobenzaprine (FLEXERIL) 5 MG tablet, Take 1 tablet by mouth 2 (Two) Times a Day As Needed., Disp: , Rfl:     diclofenac (VOLTAREN) 75 MG EC tablet, Take 1 tablet by mouth Daily As Needed., Disp: , Rfl:     docusate sodium (COLACE) 100 MG capsule, Take 2 capsules by mouth Daily., Disp: , Rfl:     Eliquis 5 MG tablet tablet, Take 1 tablet by mouth Every 12 (Twelve) Hours., Disp: , Rfl:     estradiol (ESTRACE) 0.1 MG/GM vaginal cream, Insert 2 applicators into the vagina Daily., Disp: , Rfl:     gabapentin (NEURONTIN) 300 MG capsule, Take 1 capsule by mouth 3 (Three) Times a Day., Disp: , Rfl:      "HYDROcodone-acetaminophen (NORCO) 7.5-325 MG per tablet, Take 1 tablet by mouth Every 6 (Six) Hours As Needed., Disp: , Rfl:     levalbuterol (XOPENEX HFA) 45 MCG/ACT inhaler, levalbuterol HFA 45 mcg/actuation aerosol inhaler, Disp: , Rfl:     losartan (COZAAR) 50 MG tablet, Take 1 tablet by mouth Daily., Disp: , Rfl:     meclizine (ANTIVERT) 25 MG tablet, Take 1 tablet by mouth 2 (Two) Times a Day As Needed., Disp: , Rfl:     metFORMIN (GLUCOPHAGE) 500 MG tablet, Take 1 tablet by mouth 2 (Two) Times a Day With Meals., Disp: , Rfl:     metoprolol succinate XL (TOPROL-XL) 50 MG 24 hr tablet, TAKE 1 TABLET BY MOUTH 3 (THREE) TIMES A DAY., Disp: 90 tablet, Rfl: 6    metroNIDAZOLE (METROCREAM) 0.75 % cream, Apply  topically to the appropriate area as directed 2 (Two) Times a Day., Disp: , Rfl:     omeprazole (priLOSEC) 20 MG capsule, Take 1 capsule by mouth Daily., Disp: , Rfl:     ondansetron ODT (ZOFRAN-ODT) 4 MG disintegrating tablet, Place 1 tablet on the tongue 1 (One) Time., Disp: , Rfl:     pain patient supplied pump, by Intrathecal route Continuous. Fent/Bupivicaine, Disp: , Rfl:     potassium chloride (K-DUR,KLOR-CON) 10 MEQ CR tablet, Take 1 tablet by mouth Daily., Disp: , Rfl:     hydroCHLOROthiazide (HYDRODIURIL) 25 MG tablet, Take 25 mg by mouth Daily. (Patient not taking: Reported on 12/20/2023), Disp: , Rfl:     Allergies:   Allergies   Allergen Reactions    Bupropion Mental Status Change    Naltrexone Mental Status Change       Objective   Vital Signs:   Vitals:    12/20/23 0935   BP: 120/78   BP Location: Left arm   Patient Position: Sitting   Pulse: 68   SpO2: 98%   Weight: 87.1 kg (192 lb)   Height: 165.1 cm (65\")       PHYSICAL EXAM  General appearance: Awake, alert, cooperative  Head: Normocephalic, without obvious abnormality, atraumatic  Eyes: Conjunctivae/corneas clear, EOMs intact  Neck: no adenopathy, no carotid bruit, no JVD, and thyroid: not enlarged  Lungs: clear to auscultation bilaterally " "and no rhonchi or crackles\", ' symmetric  Heart: regular rate and rhythm, S1, S2 normal, no murmur, click, rub or gallop  Abdomen: Soft, non-tender, bowel sounds normal,  no organomegaly  Extremities: extremities normal, atraumatic, no cyanosis or edema  Skin: Skin color, turgor normal, no rashes or lesions  Neurologic: Grossly normal     Lab Results   Component Value Date    GLUCOSE 143 (H) 08/30/2021    CALCIUM 9.2 08/30/2021     08/30/2021    K 4.0 08/30/2021    CO2 23.0 08/30/2021     08/30/2021    BUN 5 (L) 08/30/2021    CREATININE 0.50 (L) 07/18/2022    EGFRIFNONA 134 08/30/2021    BCR 10.2 08/30/2021    ANIONGAP 15.0 08/30/2021     Lab Results   Component Value Date    WBC 8.02 08/30/2021    HGB 15.5 08/30/2021    HCT 45.2 08/30/2021    MCV 94.8 08/30/2021     08/30/2021     No results found for: \"INR\", \"PROTIME\"  No results found for: \"TSH\", \"J3COYZC\", \"R4KIIRX\", \"THYROIDAB\"    Cardiac Testing:     I personally viewed and interpreted the patient's EKG/Telemetry/lab data    Procedures    Tobacco Cessation: N/A  Obstructive Sleep Apnea Screening: Completed    Advance Care Planning   ACP discussion was declined by the patient. Patient does not have an advance directive, declines further assistance.       Assessment & Plan    Diagnoses and all orders for this visit:    1. Paroxysmal SVT (supraventricular tachycardia) (Primary)    2. WPW (Desiree-Parkinson-White syndrome)    3. PAF (paroxysmal atrial fibrillation)    4. Chronic anticoagulation         Diagnosis Plan   1. Paroxysmal SVT (supraventricular tachycardia)  Complex situation.  Desiree-Parkinson-White syndrome.  Epicardial right anterior accessory pathway.  Multiple unsuccessful ablation attempts including 1 by myself.    Recurrent SVT.    Unfortunately last April she was treated with a cardioversion while awake here at the hospital.  They told her that while in SVT was unsafe to give her adenosine.  This is incorrect.  Patient is in SVT " "even with Desiree-Parkinson-White the correct treatment is adenosine.  There is however it is they are in preexcitation atrial fibrillation and then adenosine is contraindicated.  This can be confusing to sound.    The patient understands this distinction however.    Once again if she does have recurrent SVT adenosine would be the treatment of choice to terminate in the ER rather than a cardioversion while wide-awake.  If the cardioversion is to be performed she would tolerate this well with appropriate sedation.      2. WPW (Desiree-Parkinson-White syndrome)  Challenging anatomy.  Very difficult location of accessory pathway.  She will let me know if and when she is ready for epicardial ablation procedure which we have discussed numerous times at length.  She wishes to hold off for now.      3. PAF (paroxysmal atrial fibrillation)  Likely unrelated to her preexcitation.    [id number] equals 2    Anticoagulated for the primary prevention of stroke.          4. Chronic anticoagulation  Anticipating having lumbar pain stimulator surgery.    Her perioperative risk is low for a low risk surgery ( there is no such thing as surgical \"clearance\".)    Her anticoagulation is not to be held for more than 4 doses.  This was discussed with the patient.  This is what the FDA recommends.  This is my strong recommendation.  Holding this medication for any longer than 4 doses will be associated with excess risk of stroke morbidity and mortality and accordingly many physicians decision to hold it for longer than 4 doses will be associated with the acceptance of this risk.  Patient understands this.  She and I spoke about this at length.   Essential hypertension.  At her request we will restart her hydrochlorothiazide and low-dose potassium.     Body mass index is 31.95 kg/m².    I spent 35 minutes in consultation with this patient which included more than 65% of this time in direct face-to-face counseling, physical examination and " discussion of my assessment and findings and this shared decision making with the patient.  The remainder of the time not spent face-to-face was performing one, some or all of the following actions: preparing to see the patient (e.g. reviewing tests, prior clinicians' notes, etc), ordering medications, tests or procedures, coordination of care, discussion of the plan with other healthcare providers, documenting clinical information in epic as well as independently interpreting results and communication of these results to the patient family and/or caregiver(s).  Please note that this explicitly excludes time spent on other separate billable services such as performing procedures or test interpretation, when applicable.      Follow Up:       Thank you for allowing me to participate in the care of your patient. Please to not hesitate to contact me with additional questions or concerns.      Alberto Doyle DO, FACC, RS  Cardiac Electrophysiologist  [facility] / [facility]

## 2024-04-23 RX ORDER — METOPROLOL SUCCINATE 50 MG/1
50 TABLET, EXTENDED RELEASE ORAL 3 TIMES DAILY
Qty: 90 TABLET | Refills: 6 | Status: SHIPPED | OUTPATIENT
Start: 2024-04-23

## 2024-04-23 RX ORDER — METOPROLOL SUCCINATE 50 MG/1
50 TABLET, EXTENDED RELEASE ORAL 3 TIMES DAILY
Qty: 90 TABLET | Refills: 6 | OUTPATIENT
Start: 2024-04-23

## 2024-05-06 ENCOUNTER — TELEPHONE (OUTPATIENT)
Dept: CARDIOLOGY | Facility: CLINIC | Age: 54
End: 2024-05-06
Payer: COMMERCIAL

## 2025-01-15 ENCOUNTER — OFFICE VISIT (OUTPATIENT)
Dept: CARDIOLOGY | Facility: CLINIC | Age: 55
End: 2025-01-15
Payer: COMMERCIAL

## 2025-01-15 VITALS
WEIGHT: 208 LBS | HEART RATE: 68 BPM | BODY MASS INDEX: 33.43 KG/M2 | SYSTOLIC BLOOD PRESSURE: 108 MMHG | HEIGHT: 66 IN | OXYGEN SATURATION: 98 % | DIASTOLIC BLOOD PRESSURE: 68 MMHG

## 2025-01-15 DIAGNOSIS — I47.10 PAROXYSMAL SVT (SUPRAVENTRICULAR TACHYCARDIA): ICD-10-CM

## 2025-01-15 DIAGNOSIS — I48.0 PAF (PAROXYSMAL ATRIAL FIBRILLATION): ICD-10-CM

## 2025-01-15 DIAGNOSIS — I45.6 WPW (WOLFF-PARKINSON-WHITE SYNDROME): Primary | ICD-10-CM

## 2025-01-15 DIAGNOSIS — Z79.01 CHRONIC ANTICOAGULATION: ICD-10-CM

## 2025-01-15 RX ORDER — TIZANIDINE HYDROCHLORIDE 2 MG/1
2 CAPSULE, GELATIN COATED ORAL 4 TIMES DAILY
COMMUNITY
Start: 2024-12-20

## 2025-01-15 RX ORDER — FUROSEMIDE 20 MG/1
20 TABLET ORAL AS NEEDED
COMMUNITY
Start: 2025-01-13

## 2025-01-16 NOTE — PROGRESS NOTES
Cardiac Electrophysiology Outpatient Follow Up Note            [facility]    Follow Up Office Visit      Taylor Infante  [id number]  01/15/2025  [unfilled]  [unfilled]    Primary Care Physician: Griffin Guo MD    Referred By: No ref. provider found    Subjective     Chief Complaint:   Diagnoses and all orders for this visit:    1. WPW (Desiree-Parkinson-White syndrome) (Primary)    2. Paroxysmal SVT (supraventricular tachycardia)    3. PAF (paroxysmal atrial fibrillation)    4. Chronic anticoagulation      Chief Complaint   Patient presents with    Paroxysmal SVT (supraventricular tachycardia)       History of Present Illness:   Taylor Infante is a 54 y.o. female who presents to my electrophysiology clinic for follow up of above.      Past Medical History:   Past Medical History:   Diagnosis Date    A-fib     Arrhythmia     Arthritis     Chronic back pain     Compression fracture of lumbar vertebra 8/22/2016    L1-L2 w/ Significant kyphotic deformity. She appears to have solid arthrodesis from L2-L5. Flexion and extension films showed very litte movvement at the L1-L2 level.      Depression     Diabetes mellitus     Facial sweating     GERD (gastroesophageal reflux disease)     Hyperlipidemia     Hypertension     Hypothyroidism     Osteopetrosis     SVT (supraventricular tachycardia)     Syncope     Venous insufficiency     Wears dentures     full top only     Wears glasses     WPW (Desiree-Parkinson-White syndrome)        Past Surgical History:   Past Surgical History:   Procedure Laterality Date    APPENDECTOMY      at age 24    CARDIAC ELECTROPHYSIOLOGY PROCEDURE N/A 9/2/2021    Procedure: EP/Ablation WPW, [place], [place], hold Flec 3 days;  Surgeon: Alberto Doyle DO;  Location: [facility] INVASIVE LOCATION;  Service: Cardiology;  Laterality: N/A;    CARDIAC ELECTROPHYSIOLOGY STUDY AND ABLATION  2003    Dr. Mccoy (Dr. Fontenot assisting)     CHOLECYSTECTOMY  2003    HYSTERECTOMY  2002    partial     PAIN PUMP INSERTION/REVISION  2017    POSTERIOR LUMBAR/THORACIC SPINE FUSION  03/24/2015     l2-l5     TONSILLECTOMY  1982       Family History:   Family History   Problem Relation Age of Onset    Diabetes Other     Alzheimer's disease Other     Hypertension Other     Stroke Other     Valvular heart disease Father     Cancer Father        Social History:   Social History     Socioeconomic History    Marital status:    Tobacco Use    Smoking status: Former     Current packs/day: 2.00     Average packs/day: 2.0 packs/day for 30.0 years (60.0 ttl pk-yrs)     Types: Cigarettes    Smokeless tobacco: Never   Vaping Use    Vaping status: Never Used   Substance and Sexual Activity    Alcohol use: Never    Drug use: Never    Sexual activity: Defer       Medications:     Current Outpatient Medications:     atorvastatin (LIPITOR) 40 MG tablet, Take 1 tablet by mouth Daily., Disp: , Rfl:     cetirizine (zyrTEC) 10 MG tablet, Take 1 tablet by mouth Daily., Disp: , Rfl:     clonazePAM (KlonoPIN) 0.5 MG tablet, Take 1 tablet by mouth 2 (Two) Times a Day As Needed., Disp: , Rfl:     diclofenac (VOLTAREN) 75 MG EC tablet, Take 1 tablet by mouth Daily As Needed., Disp: , Rfl:     docusate sodium (COLACE) 100 MG capsule, Take 2 capsules by mouth Daily., Disp: , Rfl:     Eliquis 5 MG tablet tablet, Take 1 tablet by mouth Every 12 (Twelve) Hours., Disp: , Rfl:     estradiol (ESTRACE) 0.1 MG/GM vaginal cream, Insert 2 g into the vagina Daily., Disp: , Rfl:     furosemide (LASIX) 20 MG tablet, Take 1 tablet by mouth As Needed., Disp: , Rfl:     gabapentin (NEURONTIN) 300 MG capsule, Take 1 capsule by mouth 3 (Three) Times a Day., Disp: , Rfl:     HYDROcodone-acetaminophen (NORCO) 7.5-325 MG per tablet, Take 1 tablet by mouth Every 6 (Six) Hours As Needed., Disp: , Rfl:     levalbuterol (XOPENEX HFA) 45 MCG/ACT inhaler, levalbuterol HFA 45 mcg/actuation aerosol inhaler,  "Disp: , Rfl:     losartan (COZAAR) 50 MG tablet, Take 1 tablet by mouth Daily., Disp: , Rfl:     meclizine (ANTIVERT) 25 MG tablet, Take 1 tablet by mouth 2 (Two) Times a Day As Needed., Disp: , Rfl:     metFORMIN (GLUCOPHAGE) 500 MG tablet, Take 1 tablet by mouth 2 (Two) Times a Day With Meals., Disp: , Rfl:     metoprolol succinate XL (TOPROL-XL) 50 MG 24 hr tablet, Take 1 tablet by mouth 3 (Three) Times a Day., Disp: 90 tablet, Rfl: 6    metroNIDAZOLE (METROCREAM) 0.75 % cream, Apply  topically to the appropriate area as directed 2 (Two) Times a Day., Disp: , Rfl:     omeprazole (priLOSEC) 20 MG capsule, Take 1 capsule by mouth Daily., Disp: , Rfl:     ondansetron ODT (ZOFRAN-ODT) 4 MG disintegrating tablet, Place 1 tablet on the tongue 1 (One) Time., Disp: , Rfl:     pain patient supplied pump, by Intrathecal route Continuous. Fent/Bupivicaine, Disp: , Rfl:     potassium chloride (K-DUR,KLOR-CON) 10 MEQ CR tablet, Take 1 tablet by mouth Daily., Disp: , Rfl:     potassium chloride 10 MEQ CR tablet, Take 1 tablet by mouth Daily., Disp: 90 tablet, Rfl: 3    TiZANidine (ZANAFLEX) 2 MG capsule, Take 1 capsule by mouth 4 (Four) Times a Day., Disp: , Rfl:     cyclobenzaprine (FLEXERIL) 5 MG tablet, Take 1 tablet by mouth 2 (Two) Times a Day As Needed. (Patient not taking: Reported on 1/15/2025), Disp: , Rfl:     hydroCHLOROthiazide (HYDRODIURIL) 25 MG tablet, Take 1 tablet by mouth Daily., Disp: 90 tablet, Rfl: 3    Allergies:   Allergies   Allergen Reactions    Bupropion Mental Status Change    Naltrexone Mental Status Change    Trazodone Rash       Objective   Vital Signs:   Vitals:    01/15/25 1347   BP: 108/68   BP Location: Left arm   Patient Position: Sitting   Pulse: 68   SpO2: 98%   Weight: 94.3 kg (208 lb)   Height: 166.4 cm (65.5\")       PHYSICAL EXAM  General appearance: Awake, alert, cooperative  Head: Normocephalic, without obvious abnormality, atraumatic  Eyes: Conjunctivae/corneas clear, EOMs " "intact  Neck: no adenopathy, no carotid bruit, no JVD, and thyroid: not enlarged  Lungs: clear to auscultation bilaterally and no rhonchi or crackles\", ' symmetric  Heart: regular rate and rhythm, S1, S2 normal, no murmur, click, rub or gallop  Abdomen: Soft, non-tender, bowel sounds normal,  no organomegaly  Extremities: extremities normal, atraumatic, no cyanosis or edema  Skin: Skin color, turgor normal, no rashes or lesions  Neurologic: Grossly normal     Lab Results   Component Value Date    GLUCOSE 143 (H) 08/30/2021    CALCIUM 9.2 08/30/2021     08/30/2021    K 4.0 08/30/2021    CO2 23.0 08/30/2021     08/30/2021    BUN 5 (L) 08/30/2021    CREATININE 0.50 (L) 07/18/2022    EGFRIFNONA 134 08/30/2021    BCR 10.2 08/30/2021    ANIONGAP 15.0 08/30/2021     Lab Results   Component Value Date    WBC 5.91 05/22/2024    HGB 12.4 05/22/2024    HCT 37 05/22/2024    MCV 94 05/22/2024     05/22/2024     No results found for: \"INR\", \"PROTIME\"  No results found for: \"TSH\", \"P5TQMGF\", \"K0KOLAD\", \"THYROIDAB\"    Cardiac Testing:     I personally viewed and interpreted the patient's EKG/Telemetry/lab data    Procedures    Tobacco Cessation: N/A  Obstructive Sleep Apnea Screening: Completed    Advance Care Planning   ACP discussion was declined by the patient. Patient does not have an advance directive, declines further assistance.       Assessment & Plan    Diagnoses and all orders for this visit:    1. WPW (Desiree-Parkinson-White syndrome) (Primary)    2. Paroxysmal SVT (supraventricular tachycardia)    3. PAF (paroxysmal atrial fibrillation)    4. Chronic anticoagulation         Diagnosis Plan   1. WPW (Desiree-Parkinson-White syndrome)  Multiple prior attempts at ablation at multiple centers.  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    We have discussed the option of " epicardial ablation and she is somewhat reticent.    We discussed the option now of ablation with new energy source technologies and she is interested in this.  I think this is worth an attempt.    We reviewed the risk-benefit profile and I quoted her 1 to 2% chance significant procedure complication risk including but not limited to bleeding of the groin cardiac perforation tamponade stroke microinfarction death she understands risk.  She wishes to proceed.    The patient and I made a mutually shared decision ( shared decision making model ) that the patient would be best served by proceeding with the above invasive heart procedure.  This is a high risk invasive cardiac procedure which comes with significant risk of morbidity and mortality.  This patient has significant underlying  heart disease.  Taylor Infante understands these risks and   has made an informed decision to proceed.    General anesthesia  WPW and concomitant A-fib ablation  Rhythmia  9kV      2. Paroxysmal SVT (supraventricular tachycardia)  As above      3. PAF (paroxysmal atrial fibrillation)  Will perform pulmonary vein ablation at the same time as her WPW ablation.      4. Chronic anticoagulation  As above        Body mass index is 34.09 kg/m².    I spent 39 minutes in consultation with this patient which included more than 65% of this time in direct face-to-face counseling, physical examination and discussion of my assessment and findings and this shared decision making with the patient.  The remainder of the time not spent face-to-face was performing one, some or all of the following actions: preparing to see the patient (e.g. reviewing tests, prior clinicians' notes, etc), ordering medications, tests or procedures, coordination of care, discussion of the plan with other healthcare providers, documenting clinical information in epic as well as independently interpreting results and communication of these results to the patient family  and/or caregiver(s).  Please note that this explicitly excludes time spent on other separate billable services such as performing procedures or test interpretation, when applicable.      Follow Up:       Thank you for allowing me to participate in the care of your patient. Please to not hesitate to contact me with additional questions or concerns.      Alberto Doyle DO, FACC, RS  Cardiac Electrophysiologist  [facility] / [facility]

## 2025-01-20 DIAGNOSIS — S32.000A COMPRESSION FRACTURE OF LUMBAR VERTEBRA, UNSPECIFIED LUMBAR VERTEBRAL LEVEL, INITIAL ENCOUNTER: Primary | ICD-10-CM

## 2025-01-20 DIAGNOSIS — I48.0 PAF (PAROXYSMAL ATRIAL FIBRILLATION): ICD-10-CM

## 2025-01-20 DIAGNOSIS — I45.6 WPW (WOLFF-PARKINSON-WHITE SYNDROME): Primary | ICD-10-CM

## 2025-01-27 NOTE — NURSING NOTE
PRE-PVA ASSESSMENT  Taylor Infante 1970   [address] [place] [address]           Referral Source: Griffin Guo MD   Information obtained from: [x] Medical record review  [x] Patient report  Scheduled for: PVA w/ PFA on 3/12/25 with Dr. oDyle  Allergies   Allergen Reactions    Bupropion Mental Status Change    Naltrexone Mental Status Change    Trazodone Rash       AFib Specific History:  AFIBTYPE: paroxysmal    CHADS-VASc Risk Assessment               3 Total Score    1 Hypertension    1 DM    1 Sex: Female    Criteria that do not apply:    CHF    Age >/= 75    PRIOR STROKE/TIA/THROMBO    Vascular Disease    Age 65-74            Anticoagulation: Eliquis 5 mg BID, NO missed doses.   Cardioversion x 0  Failed AAD(s): 0  Prior Ablation: multiple prior out outside facilities     Is Ms. Infante aware of her AFib? Yes   Onset: 2022    Exacerbations:    Frequency:    Alleviations:     Duration:      Symptoms:   [] Palpitations:    [] Chest Discomfort:    [] Dizziness:    [] Presyncope:    [] Lightheadedness:   [] Syncope:    [] Fatigue:    [] Other:     [] Short of Breath:     Last Echo(s):  [] TTE Date:  ?          Past medical History:     [x] Diabetes   Tx: metformin            [x] HYPOthyroidism  [] HYPERthyroidism      Tx: synthroid     [x] HTN        [x] Tx: losartan, metoprolol, HCTZ    [] Heart Failure  NO   [] CVA  NO                             [] TIA NO          [] CAD    NO     [] MI  NO          [x] Dyslipidemia  [x] Statin indicated: atorvastatin     [] Ischemic Evaluation NO    [] Sleep Apnea Suspected- NO        [] Obesity       [x] BMI 30-35        [x] Weight reduction discussed with Ms. Infante            Other Pertinent PMH: No medications to stop     Summary of Patient Contact:      I spoke with Ms. Infante about her upcoming PVA.   She was well informed about the procedure from prior discussion with Dr. Doyle and from reading the provided literature.  We discussed the procedure at  length including risks, anesthesia, intra-op procedures, recovery, bedrest, normal post-procedure expectations, and success rates.  I answered a few remaining questions. MsMagdalena Infante verbalized understanding and she is ready to proceed.

## 2025-01-28 DIAGNOSIS — I48.0 PAROXYSMAL ATRIAL FIBRILLATION: Primary | ICD-10-CM

## 2025-01-28 RX ORDER — SODIUM CHLORIDE 0.9 % (FLUSH) 0.9 %
10 SYRINGE (ML) INJECTION AS NEEDED
OUTPATIENT
Start: 2025-01-28

## 2025-01-28 RX ORDER — NITROGLYCERIN 0.4 MG/1
0.4 TABLET SUBLINGUAL
OUTPATIENT
Start: 2025-01-28

## 2025-01-28 RX ORDER — SODIUM CHLORIDE 0.9 % (FLUSH) 0.9 %
10 SYRINGE (ML) INJECTION EVERY 12 HOURS SCHEDULED
OUTPATIENT
Start: 2025-01-28

## 2025-01-28 RX ORDER — SODIUM CHLORIDE 9 MG/ML
40 INJECTION, SOLUTION INTRAVENOUS AS NEEDED
OUTPATIENT
Start: 2025-01-28

## 2025-03-05 ENCOUNTER — TELEPHONE (OUTPATIENT)
Dept: CARDIOLOGY | Facility: CLINIC | Age: 55
End: 2025-03-05

## 2025-03-05 NOTE — TELEPHONE ENCOUNTER
Caller: Taylor Infante     Relationship: SELF    Best call back number: [phone]    What is your medical concern? PT WILL BE GETTING AN ABLATION ON 03.12.25. THE LAST TIME SHE HAD ONE SHE WAS PART OF A PROGRAM THAT HELPED HER WITH A HOTEL ROOM. SHE WOULD LIKE TO INQUIRE IF THAT PROGRAM WAS STILL AVAILABLE. PLEASE REACH OUT TO THE PT TO ADDRESS THIS.

## 2025-03-11 ENCOUNTER — ANESTHESIA EVENT (OUTPATIENT)
Dept: CARDIOLOGY | Facility: HOSPITAL | Age: 55
End: 2025-03-11
Payer: COMMERCIAL

## 2025-03-12 ENCOUNTER — ANESTHESIA (OUTPATIENT)
Dept: CARDIOLOGY | Facility: HOSPITAL | Age: 55
End: 2025-03-12
Payer: COMMERCIAL

## 2025-03-12 ENCOUNTER — HOSPITAL ENCOUNTER (OUTPATIENT)
Facility: HOSPITAL | Age: 55
Discharge: HOME | End: 2025-03-12
Attending: INTERNAL MEDICINE | Admitting: INTERNAL MEDICINE
Payer: COMMERCIAL

## 2025-03-12 VITALS
HEART RATE: 76 BPM | HEIGHT: 65 IN | WEIGHT: 199.6 LBS | DIASTOLIC BLOOD PRESSURE: 61 MMHG | SYSTOLIC BLOOD PRESSURE: 110 MMHG | BODY MASS INDEX: 33.26 KG/M2 | RESPIRATION RATE: 18 BRPM | TEMPERATURE: 97.4 F | OXYGEN SATURATION: 95 %

## 2025-03-12 DIAGNOSIS — I48.0 PAROXYSMAL ATRIAL FIBRILLATION: ICD-10-CM

## 2025-03-12 DIAGNOSIS — I48.0 PAF (PAROXYSMAL ATRIAL FIBRILLATION): ICD-10-CM

## 2025-03-12 DIAGNOSIS — I45.6 WPW (WOLFF-PARKINSON-WHITE SYNDROME): ICD-10-CM

## 2025-03-12 PROBLEM — I48.19 PERSISTENT ATRIAL FIBRILLATION: Status: ACTIVE | Noted: 2025-03-12

## 2025-03-12 LAB
ANION GAP SERPL CALCULATED.3IONS-SCNC: 12 MMOL/L (ref 5–15)
BUN SERPL-MCNC: 7 MG/DL (ref 6–20)
BUN/CREAT SERPL: 12.5 (ref 7–25)
CALCIUM SPEC-SCNC: 9.4 MG/DL (ref 8.6–10.5)
CHLORIDE SERPL-SCNC: 101 MMOL/L (ref 98–107)
CO2 SERPL-SCNC: 30 MMOL/L (ref 22–29)
CREAT SERPL-MCNC: 0.56 MG/DL (ref 0.57–1)
DEPRECATED RDW RBC AUTO: 39.9 FL (ref 37–54)
EGFRCR SERPLBLD CKD-EPI 2021: 110 ML/MIN/1.73
ERYTHROCYTE [DISTWIDTH] IN BLOOD BY AUTOMATED COUNT: 12 % (ref 12.3–15.4)
GLUCOSE BLDC GLUCOMTR-MCNC: 100 MG/DL (ref 70–130)
GLUCOSE BLDC GLUCOMTR-MCNC: 105 MG/DL (ref 70–130)
GLUCOSE SERPL-MCNC: 98 MG/DL (ref 65–99)
HCT VFR BLD AUTO: 40.8 % (ref 34–46.6)
HGB BLD-MCNC: 13.8 G/DL (ref 12–15.9)
MCH RBC QN AUTO: 30.9 PG (ref 26.6–33)
MCHC RBC AUTO-ENTMCNC: 33.8 G/DL (ref 31.5–35.7)
MCV RBC AUTO: 91.3 FL (ref 79–97)
PLATELET # BLD AUTO: 191 10*3/MM3 (ref 140–450)
PMV BLD AUTO: 10.1 FL (ref 6–12)
POTASSIUM SERPL-SCNC: 4.2 MMOL/L (ref 3.5–5.2)
RBC # BLD AUTO: 4.47 10*6/MM3 (ref 3.77–5.28)
SODIUM SERPL-SCNC: 143 MMOL/L (ref 136–145)
WBC NRBC COR # BLD AUTO: 8.21 10*3/MM3 (ref 3.4–10.8)

## 2025-03-12 PROCEDURE — 93657 TX L/R ATRIAL FIB ADDL: CPT | Performed by: INTERNAL MEDICINE

## 2025-03-12 PROCEDURE — C1760 CLOSURE DEV, VASC: HCPCS | Performed by: INTERNAL MEDICINE

## 2025-03-12 PROCEDURE — 25010000002 PROTAMINE SULFATE PER 10 MG: Performed by: INTERNAL MEDICINE

## 2025-03-12 PROCEDURE — 93622 COMP EP EVAL L VENTR PAC&REC: CPT | Performed by: INTERNAL MEDICINE

## 2025-03-12 PROCEDURE — 25010000002 PHENYLEPHRINE 10 MG/ML SOLUTION 1 ML VIAL: Performed by: NURSE ANESTHETIST, CERTIFIED REGISTERED

## 2025-03-12 PROCEDURE — 0897T N-INVAS AUGMNT ARRHYT ALYS: CPT | Performed by: INTERNAL MEDICINE

## 2025-03-12 PROCEDURE — 25010000002 HEPARIN (PORCINE) PER 1000 UNITS: Performed by: INTERNAL MEDICINE

## 2025-03-12 PROCEDURE — C1733 CATH, EP, OTHR THAN COOL-TIP: HCPCS | Performed by: INTERNAL MEDICINE

## 2025-03-12 PROCEDURE — 25010000002 GLYCOPYRROLATE 1 MG/5ML SOLUTION: Performed by: NURSE ANESTHETIST, CERTIFIED REGISTERED

## 2025-03-12 PROCEDURE — C1894 INTRO/SHEATH, NON-LASER: HCPCS | Performed by: INTERNAL MEDICINE

## 2025-03-12 PROCEDURE — 25010000002 ONDANSETRON PER 1 MG: Performed by: NURSE ANESTHETIST, CERTIFIED REGISTERED

## 2025-03-12 PROCEDURE — 85027 COMPLETE CBC AUTOMATED: CPT | Performed by: PHYSICIAN ASSISTANT

## 2025-03-12 PROCEDURE — 25010000002 PROPOFOL 10 MG/ML EMULSION: Performed by: NURSE ANESTHETIST, CERTIFIED REGISTERED

## 2025-03-12 PROCEDURE — 93655 ICAR CATH ABLTJ DSCRT ARRHYT: CPT | Performed by: INTERNAL MEDICINE

## 2025-03-12 PROCEDURE — 82948 REAGENT STRIP/BLOOD GLUCOSE: CPT

## 2025-03-12 PROCEDURE — 25010000002 DEXAMETHASONE PER 1 MG: Performed by: NURSE ANESTHETIST, CERTIFIED REGISTERED

## 2025-03-12 PROCEDURE — 93623 PRGRMD STIMJ&PACG IV RX NFS: CPT | Performed by: INTERNAL MEDICINE

## 2025-03-12 PROCEDURE — 93656 COMPRE EP EVAL ABLTJ ATR FIB: CPT | Performed by: INTERNAL MEDICINE

## 2025-03-12 PROCEDURE — 80048 BASIC METABOLIC PNL TOTAL CA: CPT | Performed by: PHYSICIAN ASSISTANT

## 2025-03-12 PROCEDURE — 25010000002 ADENOSINE PER 6 MG: Performed by: INTERNAL MEDICINE

## 2025-03-12 PROCEDURE — 25810000003 SODIUM CHLORIDE 0.9 % SOLUTION: Performed by: NURSE ANESTHETIST, CERTIFIED REGISTERED

## 2025-03-12 PROCEDURE — C1730 CATH, EP, 19 OR FEW ELECT: HCPCS | Performed by: INTERNAL MEDICINE

## 2025-03-12 PROCEDURE — 93005 ELECTROCARDIOGRAM TRACING: CPT | Performed by: INTERNAL MEDICINE

## 2025-03-12 PROCEDURE — 25010000002 NITROGLYCERIN 5 MG/ML SOLUTION: Performed by: INTERNAL MEDICINE

## 2025-03-12 PROCEDURE — C1759 CATH, INTRA ECHOCARDIOGRAPHY: HCPCS | Performed by: INTERNAL MEDICINE

## 2025-03-12 PROCEDURE — 25010000002 SUGAMMADEX 200 MG/2ML SOLUTION: Performed by: NURSE ANESTHETIST, CERTIFIED REGISTERED

## 2025-03-12 PROCEDURE — C1732 CATH, EP, DIAG/ABL, 3D/VECT: HCPCS | Performed by: INTERNAL MEDICINE

## 2025-03-12 PROCEDURE — 85347 COAGULATION TIME ACTIVATED: CPT

## 2025-03-12 PROCEDURE — 93010 ELECTROCARDIOGRAM REPORT: CPT | Performed by: INTERNAL MEDICINE

## 2025-03-12 PROCEDURE — C1766 INTRO/SHEATH,STRBLE,NON-PEEL: HCPCS | Performed by: INTERNAL MEDICINE

## 2025-03-12 PROCEDURE — 25810000003 SODIUM CHLORIDE 0.9 % SOLUTION 250 ML FLEX CONT: Performed by: NURSE ANESTHETIST, CERTIFIED REGISTERED

## 2025-03-12 RX ORDER — GLYCOPYRROLATE 0.2 MG/ML
INJECTION INTRAMUSCULAR; INTRAVENOUS AS NEEDED
Status: DISCONTINUED | OUTPATIENT
Start: 2025-03-12 | End: 2025-03-12 | Stop reason: SURG

## 2025-03-12 RX ORDER — ADENOSINE 3 MG/ML
INJECTION, SOLUTION INTRAVENOUS
Status: DISCONTINUED | OUTPATIENT
Start: 2025-03-12 | End: 2025-03-12 | Stop reason: HOSPADM

## 2025-03-12 RX ORDER — NITROGLYCERIN 0.4 MG/1
0.4 TABLET SUBLINGUAL
Status: DISCONTINUED | OUTPATIENT
Start: 2025-03-12 | End: 2025-03-12 | Stop reason: HOSPADM

## 2025-03-12 RX ORDER — SODIUM CHLORIDE 9 MG/ML
INJECTION, SOLUTION INTRAVENOUS CONTINUOUS PRN
Status: DISCONTINUED | OUTPATIENT
Start: 2025-03-12 | End: 2025-03-12 | Stop reason: SURG

## 2025-03-12 RX ORDER — DEXAMETHASONE SODIUM PHOSPHATE 4 MG/ML
INJECTION, SOLUTION INTRA-ARTICULAR; INTRALESIONAL; INTRAMUSCULAR; INTRAVENOUS; SOFT TISSUE AS NEEDED
Status: DISCONTINUED | OUTPATIENT
Start: 2025-03-12 | End: 2025-03-12 | Stop reason: SURG

## 2025-03-12 RX ORDER — NITROGLYCERIN 5 MG/ML
INJECTION, SOLUTION INTRAVENOUS
Status: DISCONTINUED | OUTPATIENT
Start: 2025-03-12 | End: 2025-03-12 | Stop reason: HOSPADM

## 2025-03-12 RX ORDER — ONDANSETRON 2 MG/ML
INJECTION INTRAMUSCULAR; INTRAVENOUS AS NEEDED
Status: DISCONTINUED | OUTPATIENT
Start: 2025-03-12 | End: 2025-03-12 | Stop reason: SURG

## 2025-03-12 RX ORDER — HEPARIN SODIUM 10000 [USP'U]/100ML
INJECTION, SOLUTION INTRAVENOUS
Status: DISCONTINUED | OUTPATIENT
Start: 2025-03-12 | End: 2025-03-12 | Stop reason: HOSPADM

## 2025-03-12 RX ORDER — ROCURONIUM BROMIDE 10 MG/ML
INJECTION, SOLUTION INTRAVENOUS AS NEEDED
Status: DISCONTINUED | OUTPATIENT
Start: 2025-03-12 | End: 2025-03-12 | Stop reason: SURG

## 2025-03-12 RX ORDER — FAMOTIDINE 20 MG/1
20 TABLET, FILM COATED ORAL ONCE
Status: DISCONTINUED | OUTPATIENT
Start: 2025-03-12 | End: 2025-03-12 | Stop reason: HOSPADM

## 2025-03-12 RX ORDER — LIDOCAINE HYDROCHLORIDE 10 MG/ML
0.5 INJECTION, SOLUTION EPIDURAL; INFILTRATION; INTRACAUDAL; PERINEURAL ONCE AS NEEDED
Status: DISCONTINUED | OUTPATIENT
Start: 2025-03-12 | End: 2025-03-12 | Stop reason: HOSPADM

## 2025-03-12 RX ORDER — SODIUM CHLORIDE 0.9 % (FLUSH) 0.9 %
10 SYRINGE (ML) INJECTION EVERY 12 HOURS SCHEDULED
Status: DISCONTINUED | OUTPATIENT
Start: 2025-03-12 | End: 2025-03-12 | Stop reason: HOSPADM

## 2025-03-12 RX ORDER — SODIUM CHLORIDE 0.9 % (FLUSH) 0.9 %
10 SYRINGE (ML) INJECTION AS NEEDED
Status: DISCONTINUED | OUTPATIENT
Start: 2025-03-12 | End: 2025-03-12 | Stop reason: HOSPADM

## 2025-03-12 RX ORDER — PROTAMINE SULFATE 10 MG/ML
INJECTION, SOLUTION INTRAVENOUS
Status: DISCONTINUED | OUTPATIENT
Start: 2025-03-12 | End: 2025-03-12 | Stop reason: HOSPADM

## 2025-03-12 RX ORDER — SODIUM CHLORIDE, SODIUM LACTATE, POTASSIUM CHLORIDE, CALCIUM CHLORIDE 600; 310; 30; 20 MG/100ML; MG/100ML; MG/100ML; MG/100ML
9 INJECTION, SOLUTION INTRAVENOUS CONTINUOUS
Status: DISCONTINUED | OUTPATIENT
Start: 2025-03-13 | End: 2025-03-12 | Stop reason: HOSPADM

## 2025-03-12 RX ORDER — FAMOTIDINE 10 MG/ML
20 INJECTION, SOLUTION INTRAVENOUS ONCE
Status: COMPLETED | OUTPATIENT
Start: 2025-03-12 | End: 2025-03-12

## 2025-03-12 RX ORDER — ONDANSETRON 2 MG/ML
4 INJECTION INTRAMUSCULAR; INTRAVENOUS ONCE AS NEEDED
Status: DISCONTINUED | OUTPATIENT
Start: 2025-03-12 | End: 2025-03-12 | Stop reason: HOSPADM

## 2025-03-12 RX ORDER — EPHEDRINE SULFATE 50 MG/ML
INJECTION, SOLUTION INTRAVENOUS AS NEEDED
Status: DISCONTINUED | OUTPATIENT
Start: 2025-03-12 | End: 2025-03-12 | Stop reason: SURG

## 2025-03-12 RX ORDER — HEPARIN SODIUM 1000 [USP'U]/ML
INJECTION, SOLUTION INTRAVENOUS; SUBCUTANEOUS
Status: DISCONTINUED | OUTPATIENT
Start: 2025-03-12 | End: 2025-03-12 | Stop reason: HOSPADM

## 2025-03-12 RX ORDER — LIDOCAINE HYDROCHLORIDE 40 MG/ML
SOLUTION TOPICAL AS NEEDED
Status: DISCONTINUED | OUTPATIENT
Start: 2025-03-12 | End: 2025-03-12 | Stop reason: SURG

## 2025-03-12 RX ORDER — COLCHICINE 0.6 MG/1
0.6 TABLET ORAL DAILY
COMMUNITY

## 2025-03-12 RX ORDER — PROPOFOL 10 MG/ML
VIAL (ML) INTRAVENOUS AS NEEDED
Status: DISCONTINUED | OUTPATIENT
Start: 2025-03-12 | End: 2025-03-12 | Stop reason: SURG

## 2025-03-12 RX ORDER — SODIUM CHLORIDE 9 MG/ML
40 INJECTION, SOLUTION INTRAVENOUS AS NEEDED
Status: DISCONTINUED | OUTPATIENT
Start: 2025-03-12 | End: 2025-03-12 | Stop reason: HOSPADM

## 2025-03-12 RX ADMIN — EPHEDRINE SULFATE 10 MG: 50 INJECTION INTRAVENOUS at 12:44

## 2025-03-12 RX ADMIN — ROCURONIUM BROMIDE 20 MG: 10 INJECTION INTRAVENOUS at 12:46

## 2025-03-12 RX ADMIN — PHENYLEPHRINE HYDROCHLORIDE 10 MCG/MIN: 10 INJECTION INTRAVENOUS at 10:56

## 2025-03-12 RX ADMIN — DEXAMETHASONE SODIUM PHOSPHATE 4 MG: 4 INJECTION, SOLUTION INTRAMUSCULAR; INTRAVENOUS at 11:04

## 2025-03-12 RX ADMIN — PROPOFOL 150 MG: 10 INJECTION, EMULSION INTRAVENOUS at 10:46

## 2025-03-12 RX ADMIN — SODIUM CHLORIDE: 9 INJECTION, SOLUTION INTRAVENOUS at 10:40

## 2025-03-12 RX ADMIN — GLYCOPYRROLATE 0.2 MCG: 1 INJECTION INTRAMUSCULAR; INTRAVENOUS at 11:07

## 2025-03-12 RX ADMIN — SUGAMMADEX 200 MG: 100 INJECTION, SOLUTION INTRAVENOUS at 13:16

## 2025-03-12 RX ADMIN — ONDANSETRON 4 MG: 2 INJECTION INTRAMUSCULAR; INTRAVENOUS at 13:16

## 2025-03-12 RX ADMIN — LIDOCAINE HYDROCHLORIDE 1 EACH: 40 SOLUTION TOPICAL at 10:49

## 2025-03-12 RX ADMIN — FAMOTIDINE 20 MG: 10 INJECTION, SOLUTION INTRAVENOUS at 09:23

## 2025-03-12 RX ADMIN — ROCURONIUM BROMIDE 80 MG: 10 INJECTION INTRAVENOUS at 10:47

## 2025-03-12 NOTE — OP NOTE
Cardiac Electrophysiology Procedure Note           [facility] at [facility]         CATHETER ABLATION FOR ATRIAL FIBRILLATION (PVI)    PROCEDURES PERFORMED:    Catheter ablation of persistent and paroxysmal atrial fibrillation  Adjunctive ablation of the left atrial roof for persistent atrial fibrillation  Adjunctive ablation of the left atrial inferior wall for persistent atrial fibrillation  Catheter ablation of the organized atrial tachycardia arising from the left atrial posterior wall described as SVT #1  Catheter ablation of orthodromic reciprocating tachycardia using right atrial anterior/free wall manifest accessory pathway/ablation of Desiree-Parkinson-White syndrome.  This is referred to his SVT #2  Full diagnostic EP study  Rhythmia 3D electroanatomic mapping  The [employer] 3D mapping system was used for additional and incremental mapping of tachycardia(s).    Full EP study  drug infusion / programmed pacing  Intracadiac Echocardiography  transeptal puncture  Left ventricular pacing and recording    PREPROCEDURAL DIAGNOSES:    1. Persistent Atrial fibrillation    Problem List:     WPW  Diagnosed at age 22  Lifelong tachycardia and palpitations  S/p RFA x 5 prior to today all targeting WPW, all unsuccessful  Two ablations in late 1990s in [place]  One ablation with Dr. Tal Morfin in [place] in the the early 2000s  One ablation at the [facility] in the late 2000s  One ablation with me on 9.2.2021  Paroxysmal and persistent atrial fibrillation developing in the last several years.  Hypertension  Hypothyroidism  Arthritis  Chronic Back Pain  Compression fracture of lumbar vertebra, 8/22/16, L1-L2 w/ significant kyphotic deformity. Solid arthrodesis from L2-L5.   Pain pump implanted  Depression  Hearing loss   Osteoporosis  Venous insufficiency  Past Surgical Hx  Cholecystectomy, 2003  Partial hysterectomy, 2002  Mastoidectomy, 1972  Posterior lumbar/ thoracic spine  infusion, L2-L5, 3/24/15  Gastric bypass  Tonsillectomy, 1982    POST PROCEDURE DIANGOSES:  As above.    INDICATION FOR PROCEDURE:  Briefly, Taylor Infante is a 54 y.o. year old female with a history of multiple prior attempted cardiac ablations for Desiree-Parkinson-White.  She was initially diagnosed with Desiree-Parkinson-White at age 22 but suffered lifelong palpitations prior to this.  She underwent a pair of ablations in the 1990s here in [facility].  She was referred to then to Dr. Tal Chavez in [place].  This procedure was unsuccessful.  She was then referred to the [facility] in the late 2000's for another unsuccessful procedure.  I attempted to ablate her Desiree-Parkinson-White on September 2, 2021 unsuccessfully.  I surmised from my initial ablation procedure that the patient had a right atrial free wall, anterior right atrial epicardial accessory pathway.  Despite aggressive attempts with radiofrequency application we were not able to affect the accessory pathway in the slightest.    For quite some time she deferred a repeat ablation procedure.  We had discussed the possibility of her needing an epicardial ablation procedure perhaps with surgical access.  She was not interested in this because of the invasiveness of this kind of approach.  Ultimately with the Mandaeism of new ablation technologies we reconsidered this.  She then developed atrial fibrillation which was progressive.  She was started with paroxysmal episodes of A-fib and then ultimately had persistent episodes of A-fib several of which required hospitalization transthoracic cardioversion, wholly an unpleasant chapter in her life.    Lengthy careful discussion she presented back today for repeat ablation procedure.  This is her sixth ablation procedure.  Today's goal was to target not only atrial fibrillation but also reattempt Desiree-Parkinson-White ablation.      ANTICOAGULATION STRATEGY PRIOR TO AND POST PROCEDURE:  DOAC.  The  "last dose of anticoagulant was confirmed to have been taken this AM.      PT/INR:  No results found for: \"LABPROT\", \"INR\"  PTT:  No results found for: \"APTT\"    CBC:   WBC   Date Value Ref Range Status   03/12/2025 8.21 3.40 - 10.80 10*3/mm3 Final     RBC   Date Value Ref Range Status   03/12/2025 4.47 3.77 - 5.28 10*6/mm3 Final     Hemoglobin   Date Value Ref Range Status   03/12/2025 13.8 12.0 - 15.9 g/dL Final     Hematocrit   Date Value Ref Range Status   03/12/2025 40.8 34.0 - 46.6 % Final     MCV   Date Value Ref Range Status   03/12/2025 91.3 79.0 - 97.0 fL Final     RDW   Date Value Ref Range Status   03/12/2025 12.0 (L) 12.3 - 15.4 % Final     Platelets   Date Value Ref Range Status   03/12/2025 191 140 - 450 10*3/mm3 Final     BMP:     Sodium   Date Value Ref Range Status   03/12/2025 143 136 - 145 mmol/L Final     Potassium   Date Value Ref Range Status   03/12/2025 4.2 3.5 - 5.2 mmol/L Final     Chloride   Date Value Ref Range Status   03/12/2025 101 98 - 107 mmol/L Final     CO2   Date Value Ref Range Status   03/12/2025 30.0 (H) 22.0 - 29.0 mmol/L Final     BUN   Date Value Ref Range Status   03/12/2025 7 6 - 20 mg/dL Final     Creatinine   Date Value Ref Range Status   03/12/2025 0.56 (L) 0.57 - 1.00 mg/dL Final     eGFR   Date Value Ref Range Status   03/12/2025 110.0 >60.0 mL/min/1.73 Final       Vital Signs: /77   Pulse 75   Temp 97.4 °F (36.3 °C) (Tympanic)   Resp 18   Ht 165.1 cm (65\")   Wt 90.5 kg (199 lb 9.6 oz)   SpO2 91%   BMI 33.22 kg/m²      Admit Weight:  90.5 kg (199 lb 9.6 oz)  BMI: Body mass index is 33.22 kg/m².    PROCEDURE NARRATIVE:    The patient was able to give written informed consent after revisiting the key portions of the risk versus benefit profile of the procedure.  This discussion was framed by our lengthy conversations  (please see our detailed notes).  Patient verbalized strong understanding of this discussion and a strong desire to proceed with the " procedure.  Please note that this detailed informed consent process utilized mutual and shared decision making process between all parties involved, principally the physician and patient, but also potentially with input from the patient's selected family and friends.    The patient was brought to the EP laboratory in the post absorptive state.  The patient was electively intubated for the procedure and given a general anesthetic by colleagues from anesthesia.  Please see the detailed anesthesia records.    The patient was then prepared and draped in a routing sterile fashion.  Seldinger access was obtained at the bilateral common femoral veins with 3 venipunctures.  J tip wires were advanced into the vascular space.  Short 11, 8 and a long  sheath were placed into the bilateral common femoral veins and the inferior vena cava / right atrium in an over the wire fashion.    The patient was anticoagulated with intravenous heparin (initial bolus and then continuous infusion) with a goal ACT of between 350 and 400 seconds.    A phased array ICE catheter was placed into the right atrium and right ventricle.  This was used for transeptal puncture, monitoring of the pericardial space, monitoring of the ablation catheter position within the heart and monitoring of other cardiac structures such as the left atrial appendage (to document the absence of thrombus), pulmonary veins, esophagus, mitral valve annulus and other cardiac structures.    Calderon-septal puncture was performed after heparin administration with a combination of echocardiographic and fluoroscopic guidance.  This was performed with the long sheath, a BRK needle, and a SafeSept transeptal wire.  Catheters and sheaths were advanced safely into the left atrium.  A multielectrode electrophysiology catheter was used for pacing and recording in the left atrium, left atrial appendage, pulmonary veins and left ventricle at various times throughout the procedure.    We  used the [employer] 3D electroanatomic mapping system in conjunction with these catheters to construct an electroanatomic shell of the left atrium, left atrial appendage, mitral valve annulus, interatrial septum and pulmonary veins.     The [employer] 3D mapping system was used for additional and incremental mapping of tachycardia(s).  Please note that this is an FDA approved non-contact mapping system.  It was used in addition to the 3D mapping system noted above.    Left ventricular pacing and recording were performed by placing catheters safely and carefully across the mitral valve annulus to the left ventricle from the left atrium.  Adequate sensing and pacing thresholds were obtained from the left ventricle.  AV conduction ( antegrade ) as well as VA conduction ( retrograde ) were studied.  This was performed as a distinct addition to the diagnostic EP study.  Findings were conclusive for no accessory pathway and VA dissociation.    Catheter ablation was performed to achieve pulmonary vein isolation.  A wide antral circumferential ablation approach was used.  Additional lesions were given within the antral lesion set at the juancarlos between superior and inferior veins to achieve total isolation, when and where necessary.      The patient remained in atrial fibrillation.  Adjunctive ablation was performed to achieve isolation of the left atrial roof connecting the left superior to the right superior pulmonary veins.    The patient remained in atrial fibrillation.  Additional adjunctive ablation was performed to achieve isolation of the left atrial inferior wall connecting the left inferior to the right inferior pulmonary veins.     Organized atrial tachycardia ensued.  This was mapped as a separate arrhythmia.  This is referred to as SVT #1.  The mechanism was distinct from atrial fibrillation.  The mechanism of this tachycardia was determined to be a focal atrial tachycardia arising from the left atrial posterior wall.  " We ablated this SVT and in turn isolated the entire left atrial posterior wall.    We turned our attention to the patients WPW at this point.  This is referred to as SVT 2.      SVT was easily inducible.  Atrial extrastimulus testing induce tachycardia reliably without a critical prolongation of the AH interval.  Rather induction of tachycardia depended upon antegrade blocking the accessory pathway.  Antegrade accessory pathway effective refractory period was 300 ms at a drivetrain of 600 ms.  Tachycardia with sustained easily for many minutes.  Tachycardia cycle length was 290 ms.  The VA interval was stable.  Earliest atrial activation was at the lateral right atrium.  During tachycardia, I paced the ventricle at 20 milliseconds faster than the tachycardia cycle length.  During pacing, the atrium was accelerated to the pacing cycle length.  Upon cessation of pacing the tachycardia continued at its initial cycle length and a \"VAV\" response was noted.  Thus atrial tachycardia was excluded.  Additionally, the post pacing interval was noted to be 90 msec.  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      The tachycardia was mapped with Rhythmia as well as with the vector mapping system.  Earliest atrial activation was noted at the right atrial free wall similar to where we had mapped it in 2021.  High density electroanatomic mapping was performed during tachycardia many thousand activation points were obtained.  Discrete accessory pathway potentials during tachycardia were noted at this earliest atrial activation site at the tricuspid valve annulus.  Antegrade activation during sinus rhythm was also mapped with the vector mapping system.  This was during adenosine infusion to facilitate AV oswaldo block and activation of the ventricle purely over the accessory pathway.    Turned her attention to catheter " ablation.  A 31 mm ablation cath was positioned at the earliest activation site during orthodromic reciprocating tachycardia.  Multiple applications were administered however with the first application antegrade accessory pathway conduction was eliminated as well as retrograde accessory pathway conduction.  Additional lesions were given to consolidate.    Following ablation we attempted to reinduce tachycardia unsuccessfully.  Antegrade activation had been eliminated over the accessory pathway.  Retrograde iliac fixation was also limited over the accessory pathway.  There was no evidence of any accessory pathway conduction and either way over a observation period of 40 minutes.    Isoproterenol was initiated and titrated to 3 mcg/min and was administered intravenously following ablation to test for other atrial and or ventricular arrhythmias.   Programmed stimulation was performed in an attempt to induce other and additional arrhythmias.  No arrhythmias were induced during administration and washout.    After completing the antral ablation lesion set, I interrogated the pulmonary veins using and documented pulmonary vein isolation.    Adenosine 12 mg was administered intravenously following ablation to test for other atrial and or ventricular arrhythmias.   Programmed stimulation was performed in an attempt to induce other and additional arrhythmias.  No arrhythmias were induced during administration and washout.    The ICE catheter revealed that there was no pericardial effusion.    Catheters and sheaths were then removed from the body.    Hemostasis was achieved with Vascade closure devices.  Bedrest 2 hours.  Anticipate home later today or tomorrow.      The patient was extubated in routine fashion and transferred to recovery in stable condition.    COMPLICATIONS: none    EBL: minimal      HORN view of right atrium during ORT.  Color blush indicates retrograde accessory pathway activation during  tachycardia.        Lesions at successful site of ablation.      KEY PROCEDURAL FINDINGS:  Successful wide antral circumferential pulmonary vein isolation with ablation of the left atrial roof, left atrial posterior wall, left atrial inferior wall as outlined above.  Successful catheter ablation of Desiree-Parkinson-White with a right atrial anterior free wall accessory pathway.  Follow ablation the patient's previously easily inducible orthodromic reciprocating tachycardia was rendered noninducible.  See above figures.    POST PROCEDURAL PLAN:    Report was called the the recovery nurse responsible for the patients care.  Uninterrupted anticoagulation for not less than 90 days unless specially instructed otherwise by myself or another member of our EP physician team.  Please note that the patient and the patient’s family have been extensively counseled about this critical requirement and have agreed to comply.  Anticipate discharge this day.  Medications were reconciled, and key changes in medications include: stop Bblocker  The patient will be seen at our office per routine follow up.      Alberto Doyle DO, FACC, RS  Cardiac Electrophysiologist  [facility] / [facility]

## 2025-03-12 NOTE — Clinical Note
Hemostasis started on the right femoral vein. Vascade MVP was used in achieving hemostasis. Closure device deployed in the vessel. Hemostasis was not achieved successfully. Closure device additional comment: VASCADE FAILED, MANUAL PRESSURE HELD

## 2025-03-12 NOTE — ANESTHESIA PROCEDURE NOTES
Airway  Reason: elective    Date/Time: 3/12/2025 10:49 AM  Airway not difficult    General Information and Staff    Patient location during procedure: OR  CRNA/CAA: Naida Fermin CRNA    Indications and Patient Condition  Indications for airway management: airway protection    Preoxygenated: yes  MILS not maintained throughout    Mask difficulty assessment: 1 - vent by mask    Final Airway Details    Final airway type: endotracheal airway      Successful airway: ETT  Cuffed: yes   Successful intubation technique: video laryngoscopy  Adjuncts used in placement: intubating stylet  Endotracheal tube insertion site: oral  Blade: Vaca  Blade size: D  ETT size (mm): 7.0  Cormack-Lehane Classification: grade I - full view of glottis  Placement verified by: chest auscultation and capnometry   Cuff volume (mL): 8  Measured from: lips  ETT/EBT  to lips (cm): 20  Number of attempts at approach: 1  Assessment: lips, teeth, and gum same as pre-op and atraumatic intubation    Additional Comments  Vaca elective. Negative epigastric sounds, Breath sound equal bilaterally with symmetric chest rise and fall

## 2025-03-12 NOTE — ANESTHESIA PREPROCEDURE EVALUATION
Anesthesia Evaluation     Patient summary reviewed and Nursing notes reviewed   no history of anesthetic complications:   NPO Solid Status: > 8 hours  NPO Liquid Status: > 2 hours           Airway   Mallampati: II  TM distance: >3 FB  Neck ROM: full  No difficulty expected  Dental    (+) upper dentures and partials        Pulmonary - normal exam    breath sounds clear to auscultation  (+) asthma (very mild; has PRN inhalers but rarely needs),  Cardiovascular - normal exam    PT is on anticoagulation therapy  Patient on routine beta blocker  Rhythm: regular  Rate: normal    (+) hypertension, dysrhythmias (WPW; s/p several ablations in the past) Paroxysmal Atrial Fib, hyperlipidemia    ROS comment: No echo in EMR    Neuro/Psych  (+) dizziness/light headedness, syncope, psychiatric history Depression  GI/Hepatic/Renal/Endo    (+) obesity, GERD, diabetes mellitus type 2, thyroid problem hypothyroidism    Musculoskeletal     (+) back pain (s/p lumbar surgery)  Abdominal    Substance History - negative use     OB/GYN          Other   arthritis,     ROS/Med Hx Other: Eliquis - 3/12/25 AM    Labs 3/12/25:  Hgb 13.8, Plt 191, Cr 0.56, K 4.2                Anesthesia Plan    ASA 3     general     (Clearsight for BP monitoring if available)  intravenous induction     Anesthetic plan, risks, benefits, and alternatives have been provided, discussed and informed consent has been obtained with: patient.    Plan discussed with CRNA.    CODE STATUS:

## 2025-03-12 NOTE — ANESTHESIA POSTPROCEDURE EVALUATION
Patient: Taylor Infante    Procedure Summary       Date: 03/12/25 Room / Location: [facility] / [facility]    Anesthesia Start: 1032 Anesthesia Stop: 1332    Procedure: Ablation atrial fibrillation; WPW and concomitant, GA, Rhythmia, 9kV Diagnosis:       WPW (Desiree-Parkinson-White syndrome)      PAF (paroxysmal atrial fibrillation)      (AF;WPW)    Providers: Alberto Doyle DO Provider: Amado Garrett MD    Anesthesia Type: general ASA Status: 3            Anesthesia Type: general    Vitals  Vitals Value Taken Time   /61 03/12/25 13:32   Temp 97.2 °F (36.2 °C) 03/12/25 13:32   Pulse 77 03/12/25 13:32   Resp 14 03/12/25 13:32   SpO2 100 % 03/12/25 13:32           Post Anesthesia Care and Evaluation    Patient location during evaluation: PACU  Patient participation: complete - patient participated  Level of consciousness: awake and alert  Pain management: adequate    Airway patency: patent  Anesthetic complications: No anesthetic complications  PONV Status: none  Cardiovascular status: hemodynamically stable and acceptable  Respiratory status: nonlabored ventilation, acceptable and nasal cannula  Hydration status: acceptable

## 2025-03-12 NOTE — H&P
[facility]   HISTORY AND PHYSICAL    Patient Name: Taylor Infante  : 1970  MRN: [id number]  Primary Care Physician:  Grififn Guo MD  Date of admission: 3/12/2025    Subjective   Subjective     Chief Complaint:  WPW, PAF    History of Present Illness  Mr. Infante is a 54-year-old female with a history of Dugan Parkinson's White syndrome with multiple prior attempts at ablation at multiple facilities.  Her most recent ablation was performed by Dr. Doyle where findings were consistent with an epicardial right atrial free wall accessory pathway which was not amendable to endocardial radiofrequency ablation.  She also has a history of paroxysmal atrial fibrillation. She presents today to undergo Desiree-Parkinson-White ablation as well as pulmonary vein isolation ablation.  She is chronically anticoagulated with Eliquis 5 mg p.o. twice daily and maintained on metoprolol succinate 50 mg p.o. three times a day for arrhythmia suppression.      Personal History     Past Medical History:   Diagnosis Date    A-fib     Arrhythmia     Arthritis     Chronic back pain     Compression fracture of lumbar vertebra 2016    L1-L2 w/ Significant kyphotic deformity. She appears to have solid arthrodesis from L2-L5. Flexion and extension films showed very litte movvement at the L1-L2 level.      Depression     Diabetes mellitus     Facial sweating     GERD (gastroesophageal reflux disease)     Hyperlipidemia     Hypertension     Hypothyroidism     Osteopetrosis     SVT (supraventricular tachycardia)     Syncope     Venous insufficiency     Wears dentures     full top only     Wears glasses     WPW (Desiree-Parkinson-White syndrome)        Past Surgical History:   Procedure Laterality Date    APPENDECTOMY      at age 24    CARDIAC ELECTROPHYSIOLOGY PROCEDURE N/A 2021    Procedure: EP/Ablation WPW, [place], [place], hold Flec 3 days;  Surgeon: Alberto Doyle DO;  Location: [facility] EP INVASIVE LOCATION;  Service:  Cardiology;  Laterality: N/A;    CARDIAC ELECTROPHYSIOLOGY STUDY AND ABLATION  2003    Dr. Mccoy (Dr. Fontenot assisting)    CHOLECYSTECTOMY  2003    HYSTERECTOMY  2002    partial     PAIN PUMP INSERTION/REVISION  2017    POSTERIOR LUMBAR/THORACIC SPINE FUSION  03/24/2015     l2-l5     TONSILLECTOMY  1982       Family History: family history includes Alzheimer's disease in an other family member; Cancer in her father; Diabetes in an other family member; Hypertension in an other family member; Stroke in an other family member; Valvular heart disease in her father. Otherwise pertinent FHx was reviewed and not pertinent to current issue.    Social History:  reports that she has quit smoking. Her smoking use included cigarettes. She has a 60 pack-year smoking history. She has never used smokeless tobacco. She reports that she does not drink alcohol and does not use drugs.    Home Medications:  HYDROcodone-acetaminophen, TiZANidine, apixaban, atorvastatin, clonazePAM, cyclobenzaprine, docusate sodium, estradiol, furosemide, gabapentin, levalbuterol, losartan, meclizine, metFORMIN, metoprolol succinate XL, metroNIDAZOLE, omeprazole, ondansetron ODT, pain, and potassium chloride    Allergies:  Allergies   Allergen Reactions    Bupropion Mental Status Change    Naltrexone Mental Status Change    Trazodone Rash       Objective    Objective     Vitals:   Temp:  [97.6 °F (36.4 °C)] 97.6 °F (36.4 °C)  Heart Rate:  [61] 61  Resp:  [16] 16  BP: (111-118)/(62-65) 111/62    Physical Exam  Vitals reviewed.   Constitutional:       Appearance: Normal appearance. She is normal weight.   HENT:      Head: Normocephalic and atraumatic.      Mouth/Throat:      Mouth: Mucous membranes are moist.      Pharynx: Oropharynx is clear.   Eyes:      Extraocular Movements: Extraocular movements intact.      Pupils: Pupils are equal, round, and reactive to light.   Cardiovascular:      Rate and Rhythm: Normal rate and regular rhythm.       Pulses: Normal pulses.      Heart sounds: Normal heart sounds.   Pulmonary:      Effort: Pulmonary effort is normal.      Breath sounds: Normal breath sounds.   Abdominal:      General: Abdomen is flat. Bowel sounds are normal.      Palpations: Abdomen is soft.   Musculoskeletal:      Right lower leg: No edema.      Left lower leg: No edema.   Skin:     General: Skin is warm and dry.   Neurological:      General: No focal deficit present.      Mental Status: She is alert and oriented to person, place, and time. Mental status is at baseline.   Psychiatric:         Mood and Affect: Mood normal.         Behavior: Behavior normal.             Assessment & Plan   Assessment / Plan     Brief Patient Summary:  Taylor Infante is a 54 y.o. female with a history of Dugan Parkinson's White syndrome and paroxysmal atrial fibrillation. She presents today to undergo Desiree-Parkinson-White ablation as well as pulmonary vein isolation ablation.  She is chronically anticoagulated with Eliquis 5 mg p.o. twice daily and maintained on metoprolol succinate 50 mg p.o. three times a day for arrhythmia suppression. Her last dose of Eliquis was this morning.  She has not missed doses nor had interruption in therapy.     Active Hospital Problems:  Active Hospital Problems    Diagnosis     PAF (paroxysmal atrial fibrillation)     WPW (Desiree-Parkinson-White syndrome)      Plan:   To undergo WPW and PVI ablations today  Continue Eliquis 5mg PO BID  Follow up in one month with the [facility]  Follow up in three month with OMAR Ruby  Electrophysiology  [facility] / [facility]

## 2025-03-13 ENCOUNTER — CALL CENTER PROGRAMS (OUTPATIENT)
Dept: CALL CENTER | Facility: HOSPITAL | Age: 55
End: 2025-03-13
Payer: COMMERCIAL

## 2025-03-13 LAB
ACT BLD: 337 SECONDS (ref 82–152)
ACT BLD: 348 SECONDS (ref 82–152)
ACT BLD: 366 SECONDS (ref 82–152)
ACT BLD: 429 SECONDS (ref 82–152)
QT INTERVAL: 418 MS
QTC INTERVAL: 457 MS

## 2025-03-13 NOTE — OUTREACH NOTE
PCI/Device Survey      Flowsheet Row Responses   Facility patient discharged from? [facility]   Procedure date 03/12/25   Procedure (if device, specify in description) Ablation   Performing MD Dr. Alberto Doyle   Attempt successful? Yes   Call start time 1109   Call end time 1111   Person spoke with today (if not patient) and relationship    Has the patient had any of the following symptoms since discharge? --  [No issues]   Is the patient taking prescribed medications: Apixaban   Nursing intervention Reminded to continue to take prescribed medications   Medication comments Stop Metoprolol   Does the patient have any of the following symptoms related to the cath/surgical site? --  [Fabi groin sites-dresing intact, no issues]   Does the patient have an appointment scheduled with the cardiologist? Yes   Appointment comments Cardio 4/14/25,  Dr. Doyle 6/16/25   If the patient is a current smoker, are they able to teach back resources for cessation? Not a smoker   Did the patient feel prepared to go home on the same day as the procedure? Yes   Is the patient satisfied with the same day discharge process? Yes   Discharge process comments .   PCI/Device call completed Yes   Wrap up additional comments  reports Pt is doing well. No issues            MARIANO VALDEZ - Registered Nurse

## 2025-04-21 ENCOUNTER — OFFICE VISIT (OUTPATIENT)
Dept: CARDIOLOGY | Facility: HOSPITAL | Age: 55
End: 2025-04-21
Payer: COMMERCIAL

## 2025-04-21 VITALS
BODY MASS INDEX: 33.82 KG/M2 | DIASTOLIC BLOOD PRESSURE: 71 MMHG | OXYGEN SATURATION: 97 % | HEART RATE: 81 BPM | RESPIRATION RATE: 20 BRPM | SYSTOLIC BLOOD PRESSURE: 129 MMHG | WEIGHT: 203 LBS | HEIGHT: 65 IN

## 2025-04-21 DIAGNOSIS — I48.19 PERSISTENT ATRIAL FIBRILLATION: Primary | ICD-10-CM

## 2025-04-21 DIAGNOSIS — I10 ESSENTIAL HYPERTENSION: ICD-10-CM

## 2025-04-21 PROCEDURE — 99215 OFFICE O/P EST HI 40 MIN: CPT | Performed by: NURSE PRACTITIONER

## 2025-04-21 RX ORDER — HYDRALAZINE HYDROCHLORIDE 25 MG/1
25 TABLET, FILM COATED ORAL EVERY 12 HOURS
Qty: 60 TABLET | Refills: 1 | Status: SHIPPED | OUTPATIENT
Start: 2025-04-21

## 2025-04-21 RX ORDER — NAPROXEN 500 MG/1
500 TABLET ORAL 2 TIMES DAILY WITH MEALS
COMMUNITY

## 2025-04-21 RX ORDER — TRAMADOL HYDROCHLORIDE 50 MG/1
50 TABLET ORAL 3 TIMES DAILY PRN
COMMUNITY

## 2025-04-21 NOTE — PROGRESS NOTES
Chief Complaint  Post PVA and Atrial Fibrillation    Subjective      History of Present Illness {CC  Problem List  Visit  Diagnosis   Encounters  Notes  Medications  Labs  Result Review Imaging  Media :23}     Taylor Infante, 54 y.o. female presents to [facility] Atrial Fibrillation/arrhythmia clinic for Post PVA and Atrial Fibrillation.    Problem List:  WPW  Diagnosed at age 22  Lifelong tachycardia and palpitations  S/p RFA x 5 prior to today all targeting WPW, all unsuccessful  Two ablations in late 1990s in [place]  One ablation with Dr. Tal Morfin in [place] in the the early 2000s  One ablation at the [facility] in the late 2000s  One ablation with me on 9.2.2021  Paroxysmal and persistent atrial fibrillation developing in the last several years.  Hypertension  Hypothyroidism  Arthritis  Chronic Back Pain  Compression fracture of lumbar vertebra, 8/22/16, L1-L2 w/ significant kyphotic deformity. Solid arthrodesis from L2-L5.   Pain pump implanted  Depression  Hearing loss   Osteoporosis  Venous insufficiency  Past Surgical Hx  Cholecystectomy, 2003  Partial hysterectomy, 2002  Mastoidectomy, 1972  Posterior lumbar/ thoracic spine infusion, L2-L5, 3/24/15  Gastric bypass  Tonsillectomy, 1982    HPI:  Patient recently underwent successful wide antral circumferential pulmonary vein isolation with ablation of the left atrial roof, left atrial posterior wall, left atrial inferior wall as outlined above. Successful catheter ablation of Desiree-Parkinson-White with a right atrial anterior free wall accessory pathway. Follow ablation the patient's previously easily inducible orthodromic reciprocating tachycardia was rendered noninducible. Procedure completed by Dr. Doyle on 3/12/25. Patient was instructed to continue uninterrupted anticoagulation.      Patient presents today doing well since their procedure.  States intermittent recurrence of arrhythmia symptoms but no  "sustained tachypalpitations. She has taken Toprol-XL 25mg occasionally with improvement in symptoms. Denies access site complications, or signs/symptoms of bleeding. They have continued uninterrupted anticoagulation as instructed. SBP generally 145s on home monitoring.    Risk factor screening:  Thyroid disorder: No  Sleep apnea: No  HTN: Slightly elevated on home monitoring  Weight: Discussed 10% reduction  Alcohol: None    Objective     Vital Signs:   Vitals:    04/21/25 1216 04/21/25 1217   BP: 132/64 129/71   BP Location: Left arm Left arm   Patient Position: Sitting Standing   Cuff Size: Adult Adult   Pulse: 77 81   Resp: 20    SpO2: 97% 97%   Weight: 92.1 kg (203 lb)    Height: 165.1 cm (65\")      Body mass index is 33.78 kg/m².  Physical Exam  Vitals and nursing note reviewed.   Constitutional:       Appearance: Normal appearance.   HENT:      Head: Normocephalic.   Eyes:      Extraocular Movements: Extraocular movements intact.   Neck:      Vascular: No carotid bruit.   Cardiovascular:      Rate and Rhythm: Normal rate and regular rhythm.      Pulses: Normal pulses.      Heart sounds: Normal heart sounds, S1 normal and S2 normal. No murmur heard.  Pulmonary:      Effort: Pulmonary effort is normal. No respiratory distress.      Breath sounds: Normal breath sounds.   Musculoskeletal:      Cervical back: Neck supple.      Right lower leg: No edema.      Left lower leg: No edema.   Skin:     General: Skin is warm and dry.   Neurological:      General: No focal deficit present.      Mental Status: She is alert.   Psychiatric:         Mood and Affect: Mood normal.         Behavior: Behavior normal.         Thought Content: Thought content normal.        Data Reviewed:{ Labs  Result Review  Imaging  Med Tab  Media :23}     Basic Metabolic Panel (03/12/2025 08:53)  CBC (No Diff) (03/12/2025 08:53)  EP/CRM Study (03/12/2025 13:18)  ECG 12 Lead Tachycardia (03/12/2025 16:00)      Assessment & Plan   Assessment " and Plan {CC Problem List  Visit Diagnosis  ROS  Review (Popup)  Health Maintenance  Quality  BestPractice  Medications  SmartSets  SnapShot Encounters  Media :23}     1. Persistent atrial fibrillation/WPW  -s/p successful wide antral circumferential pulmonary vein isolation with ablation of the left atrial roof, left atrial posterior wall, left atrial inferior wall as outlined above. Successful catheter ablation of Desiree-Parkinson-White with a right atrial anterior free wall accessory pathway.  -Repeat ports arrhythmia symptoms much improved after recent ablation  - Continue anticoagulation with apixaban 5 Mg every 12 hours as prescribed  - Discussed may use Toprol-XL 25 Mg as needed if bothersome recurrent palpitations.  - Discussed AF risk factors/AF education in clinic today  - Continue routine EP follow-up as scheduled    2. Essential hypertension  -Slightly elevated on home monitoring, SBP generally 145s  -Initiate hydralzine 25mg every 12 hours  - Continue losartan 100 Mg daily as prescribed  - Discussed routine ambulatory BP monitoring      Follow Up {Instructions Charge Capture  Follow-up Communications :23}     Return if symptoms worsen or fail to improve.    Time Spent: I spent 43 minutes caring for Taylor Infante on this date of service. This time includes time spent by me in the following activities: preparing for the visit, reviewing tests, obtaining and/or reviewing a separately obtained history, performing a medically appropriate examination and/or evaluation, counseling and educating the patient/family/caregiver, documenting information in the medical record and independently interpreting results and communicating that information with the patient/family/caregiver. All time noted occurred on the date of service.    Patient was given instructions and counseling regarding her condition or for health maintenance advice. Please see specific information pulled into the AVS if  appropriate. Patient was instructed to call the [facility] with any questions, concerns, or worsening symptoms.    Dictated Utilizing Dragon Dictation   Please note that portions of this note were completed with a voice recognition program. Part of this note may be an electronic transcription/translation of spoken language to printed text using the Dragon Dictation System.

## 2025-05-05 ENCOUNTER — TELEPHONE (OUTPATIENT)
Dept: CARDIOLOGY | Facility: CLINIC | Age: 55
End: 2025-05-05

## 2025-05-05 NOTE — TELEPHONE ENCOUNTER
Dr. Doyle referred the patient to neurosurgery in January of 2025. The patient states that they called her once and she hasn't heard anything else from them. I advised her to contact their office again to see if she needs to schedule an appointment. She verbalized  understanding.

## 2025-05-05 NOTE — TELEPHONE ENCOUNTER
Caller: Taylor Infante    Relationship: Self    Best call back number: [phone]    What is the best time to reach you: ANYTIME    What was the call regarding: PT STATES THAT SHE AND DR. SANFORD HAD DISCUSSED A REFERRAL TO PAIN MANAGEMENT AND THEY REACHED OUT BUT SAID THEY NEEDED TO CONSULT WITH DR. SANFORD BUT SHE HASN'T HEARD ANYTHING BACK. PLEASE ADVISE, THANK YOU.

## 2025-06-10 NOTE — TELEPHONE ENCOUNTER
Patient was diagnosed with Covid 19 1/7/22 and since this she has increase in HR and arrhythmias . She feels tired and nervous with this. She is scheduled for an appointment with Dr. Doyle in June however with this change a sooner appointment would be beneficial. I told her I would call her back with appointment date and time.    
Will you please call the patient with a sooner appointment with Dr. Doyle. Preferably within the next 2-3 weeks if possible.   
PAST MEDICAL HISTORY:  Calculus of kidney     H/O constipation     HLD (hyperlipidemia)     Hypertension, unspecified type     Obesity     Vaginal prolapse

## 2025-06-16 ENCOUNTER — OFFICE VISIT (OUTPATIENT)
Dept: CARDIOLOGY | Facility: CLINIC | Age: 55
End: 2025-06-16
Payer: COMMERCIAL

## 2025-06-16 VITALS
HEIGHT: 66 IN | WEIGHT: 191 LBS | BODY MASS INDEX: 30.7 KG/M2 | DIASTOLIC BLOOD PRESSURE: 88 MMHG | SYSTOLIC BLOOD PRESSURE: 144 MMHG | HEART RATE: 77 BPM | OXYGEN SATURATION: 98 %

## 2025-06-16 DIAGNOSIS — I48.0 AF (PAROXYSMAL ATRIAL FIBRILLATION): ICD-10-CM

## 2025-06-16 DIAGNOSIS — I45.6 WPW (WOLFF-PARKINSON-WHITE SYNDROME): Primary | ICD-10-CM

## 2025-06-16 DIAGNOSIS — I10 ESSENTIAL HYPERTENSION: ICD-10-CM

## 2025-06-16 RX ORDER — POTASSIUM CHLORIDE 750 MG/1
20 TABLET, EXTENDED RELEASE ORAL DAILY PRN
Qty: 60 TABLET | Refills: 11 | Status: SHIPPED | OUTPATIENT
Start: 2025-06-16

## 2025-06-16 RX ORDER — FUROSEMIDE 40 MG/1
40 TABLET ORAL DAILY PRN
Qty: 30 TABLET | Refills: 11 | Status: SHIPPED | OUTPATIENT
Start: 2025-06-16

## 2025-06-16 NOTE — PROGRESS NOTES
Cardiac Electrophysiology Outpatient Follow Up Note            [facility] at [facility]    Follow Up Office Visit      Taylor Infante  [id number]  06/16/2025  [unfilled]  [unfilled]    Primary Care Physician: Griffin Guo MD    Referred By: No ref. provider found    Subjective     Chief Complaint   Patient presents with    Desiree-Parkinson-White Syndrome     Reports palpitations and lower extremity edema     Problem List:     WPW  Diagnosed at age 22  Lifelong tachycardia and palpitations  S/p RFA x 5 prior to today all targeting WPW, all unsuccessful  Two ablations in late 1990s in [place]  One ablation with Dr. Tal Morfin in [place] in the the early 2000s  One ablation at the [facility] in the late 2000s  One ablation with me on 9.2.2021  Paroxysmal and persistent atrial fibrillation developing in the last several years.  Hypertension  Hypothyroidism  Arthritis  Chronic Back Pain  Compression fracture of lumbar vertebra, 8/22/16, L1-L2 w/ significant kyphotic deformity. Solid arthrodesis from L2-L5.   Pain pump implanted  Depression  Hearing loss   Osteoporosis  Venous insufficiency  Past Surgical Hx  Cholecystectomy, 2003  Partial hysterectomy, 2002  Mastoidectomy, 1972  Posterior lumbar/ thoracic spine infusion, L2-L5, 3/24/15  Gastric bypass  Tonsillectomy, 1982       History of Present Illness:   Taylor Infante is a 54 y.o. female who presents to my electrophysiology clinic for follow up of WPW, paroxysmal SVT, PAF. 3/13/2025 she underwent pulmonary vein ablation to include the left atrial roof, and the left atrial inferior wall, atrial tachycardia arising from the left atrial posterior wall, orthodromic reciprocating tachycardia use of right atrial anterior/free wall manifest accessory pathway/ablation of Desiree-Parkinson-White syndrome.    Since the patients last visit she has not had episodes of SVT. She has had some palpitations. A few  times she has felt she has been in atrial fibrillation. This is getting better.     The patient denies chest pain, chest pressure, chest heaviness, palpitations, shortness of breath, edema, syncope, presyncope.     Past Medical History:   Diagnosis Date    A-fib     Arrhythmia     Arthritis     Chronic back pain     Compression fracture of lumbar vertebra 8/22/2016    L1-L2 w/ Significant kyphotic deformity. She appears to have solid arthrodesis from L2-L5. Flexion and extension films showed very litte movvement at the L1-L2 level.      Depression     Diabetes mellitus     Facial sweating     GERD (gastroesophageal reflux disease)     Hyperlipidemia     Hypertension     Hypothyroidism     Osteopetrosis     SVT (supraventricular tachycardia)     Syncope     Venous insufficiency     Wears dentures     full top only     Wears glasses     WPW (Desiree-Parkinson-White syndrome)        Past Surgical History:   Procedure Laterality Date    APPENDECTOMY      at age 24    CARDIAC ELECTROPHYSIOLOGY PROCEDURE N/A 09/02/2021    Procedure: EP/Ablation WPW, Rythmia, GA, hold Flec 3 days;  Surgeon: Alberto Doyle DO;  Location:  [facility];  Service: Cardiology;  Laterality: N/A;    CARDIAC ELECTROPHYSIOLOGY PROCEDURE  03/12/2025    Procedure: Ablation atrial fibrillation; WPW and concomitant, GA, Rhythmia, 9kV;  Surgeon: Alberto Doyle DO;  Location:  [facility];  Service: Cardiovascular;;    CARDIAC ELECTROPHYSIOLOGY STUDY AND ABLATION  2003    Dr. Mccoy (Dr. Fontenot assisting)    CHOLECYSTECTOMY  2003    HYSTERECTOMY  2002    partial     PAIN PUMP INSERTION/REVISION  2017    TONSILLECTOMY  1982       Family History   Problem Relation Age of Onset    Hypertension Mother     Lung disease Mother     Valvular heart disease Father     Cancer Father     Diabetes Other     Alzheimer's disease Other     Hypertension Other     Stroke Other        Social History     Socioeconomic History    Marital  status:    Tobacco Use    Smoking status: Former     Current packs/day: 2.00     Average packs/day: 2.0 packs/day for 30.0 years (60.0 ttl pk-yrs)     Types: Cigarettes     Passive exposure: Past    Smokeless tobacco: Never   Vaping Use    Vaping status: Never Used   Substance and Sexual Activity    Alcohol use: Never    Drug use: Never    Sexual activity: Defer         Current Outpatient Medications:     atorvastatin (LIPITOR) 40 MG tablet, Take 0.5 tablets by mouth Daily., Disp: , Rfl:     clonazePAM (KlonoPIN) 0.5 MG tablet, Take 1 tablet by mouth 2 (Two) Times a Day As Needed., Disp: , Rfl:     colchicine 0.6 MG tablet, Take 1 tablet by mouth Daily., Disp: , Rfl:     cyclobenzaprine (FLEXERIL) 5 MG tablet, Take 1 tablet by mouth 2 (Two) Times a Day As Needed. (Patient taking differently: Take 2 tablets by mouth 3 (Three) Times a Day As Needed for Muscle Spasms.), Disp: , Rfl:     docusate sodium (COLACE) 100 MG capsule, Take 2 capsules by mouth Daily., Disp: , Rfl:     Eliquis 5 MG tablet tablet, Take 1 tablet by mouth Every 12 (Twelve) Hours., Disp: , Rfl:     estradiol (ESTRACE) 0.1 MG/GM vaginal cream, Insert 2 g into the vagina Daily., Disp: , Rfl:     gabapentin (NEURONTIN) 300 MG capsule, Take 1 capsule by mouth 3 (Three) Times a Day., Disp: , Rfl:     hydrALAZINE (APRESOLINE) 25 MG tablet, Take 1 tablet by mouth Every 12 (Twelve) Hours., Disp: 60 tablet, Rfl: 1    levalbuterol (XOPENEX HFA) 45 MCG/ACT inhaler, levalbuterol HFA 45 mcg/actuation aerosol inhaler, Disp: , Rfl:     losartan (COZAAR) 50 MG tablet, Take 1 tablet by mouth Daily. (Patient taking differently: Take 2 tablets by mouth Daily.), Disp: , Rfl:     meclizine (ANTIVERT) 25 MG tablet, Take 1 tablet by mouth 2 (Two) Times a Day As Needed., Disp: , Rfl:     metFORMIN (GLUCOPHAGE) 500 MG tablet, Take 1 tablet by mouth 2 (Two) Times a Day With Meals., Disp: , Rfl:     Methylnaltrexone Bromide (Relistor) 150 MG tablet, Take 1 tablet by  "mouth Daily., Disp: , Rfl:     metroNIDAZOLE (METROCREAM) 0.75 % cream, Apply  topically to the appropriate area as directed 2 (Two) Times a Day., Disp: , Rfl:     naproxen (NAPROSYN) 500 MG tablet, Take 1 tablet by mouth 2 (Two) Times a Day With Meals., Disp: , Rfl:     omeprazole (priLOSEC) 20 MG capsule, Take 1 capsule by mouth Daily., Disp: , Rfl:     ondansetron ODT (ZOFRAN-ODT) 4 MG disintegrating tablet, Place 1 tablet on the tongue 1 (One) Time., Disp: , Rfl:     pain patient supplied pump, by Intrathecal route Continuous. Fent/Bupivicaine, Disp: , Rfl:     traMADol (ULTRAM) 50 MG tablet, Take 1 tablet by mouth 3 (Three) Times a Day As Needed. for pain, Disp: , Rfl:     Allergies   Allergen Reactions    Bupropion Mental Status Change    Naltrexone Mental Status Change    Trazodone Rash       Objective     Vitals:    06/16/25 1334   BP: 144/88   BP Location: Right arm   Patient Position: Sitting   Cuff Size: Adult   Pulse: 77   SpO2: 98%   Weight: 86.6 kg (191 lb)   Height: 167 cm (65.75\")     Body mass index is 31.06 kg/m².    Constitutional:       Appearance: Normal and healthy appearance. Not in distress.   Eyes:      Pupils: Pupils are equal, round, and reactive to light.   HENT:      Head: Normocephalic and atraumatic.   Pulmonary:      Effort: Pulmonary effort is normal.      Breath sounds: Normal breath sounds.   Cardiovascular:      PMI at left midclavicular line. Normal rate. Regular rhythm. Normal S1. Normal S2.       Murmurs: There is no murmur.      No gallop.  No click. No rub.   Pulses:     Intact distal pulses.   Edema:     Peripheral edema absent.   Abdominal:      General: Abdomen is flat. Bowel sounds are normal. There is no distension.      Palpations: Abdomen is soft.   Skin:     General: Skin is warm and dry.   Neurological:      General: No focal deficit present.      Mental Status: Alert, oriented to person, place, and time and oriented to person, place and time.   Psychiatric:         " "Attention and Perception: Attention normal.         Mood and Affect: Mood normal.         Behavior: Behavior is cooperative.         Lab Results   Component Value Date    GLUCOSE 98 03/12/2025    CALCIUM 9.4 03/12/2025     03/12/2025    K 4.2 03/12/2025    CO2 30.0 (H) 03/12/2025     03/12/2025    BUN 7 03/12/2025    CREATININE 0.56 (L) 03/12/2025    EGFRIFNONA 134 08/30/2021    BCR 12.5 03/12/2025    ANIONGAP 12.0 03/12/2025     Lab Results   Component Value Date    WBC 8.12 05/20/2025    HGB 13.1 05/20/2025    HCT 39.8 05/20/2025    MCV 96 05/20/2025     05/20/2025     No results found for: \"INR\", \"PROTIME\"  No results found for: \"TSH\", \"Q0DAQLK\", \"E4DVILE\", \"THYROIDAB\"    Cardiac Testing:     I personally viewed and interpreted the patient's EKG/Telemetry/lab data.    Procedures    Tobacco Cessation: N/A  Obstructive Sleep Apnea Screening: Completed    Advance Care Planning   ACP discussion was held with the patient during this visit. Patient does not have an advance directive, declines further assistance.       Assessment & Plan      Assessment & Plan  WPW (Desiree-Parkinson-White syndrome)  3/13/2025 she underwent pulmonary vein ablation to include the left atrial roof, and the left atrial inferior wall, atrial tachycardia arising from the left atrial posterior wall, orthodromic reciprocating tachycardia use of right atrial anterior/free wall manifest accessory pathway/ablation of Desiree-Parkinson-White syndrome.         AF (paroxysmal atrial fibrillation)  OAC- Eliquis 5mg PO BID        Essential hypertension    Today's /88    She recently had Hydralazine add for better BP control due to 's  She does have 2+ BLE edema, shortness of breath exertional. Add Lasix 40mg PO q day x3 days, KCL 20meq po Qday x3 days.    Keep BP log and bring to visits             Follow Up:   Return in about 3 months (around 9/16/2025).    Thank you for allowing me to participate in the care of your " patient. Please to not hesitate to contact me with additional questions or concerns.      OMAR Ma  [facility] / [facility]

## 2025-06-16 NOTE — ASSESSMENT & PLAN NOTE
3/13/2025 she underwent pulmonary vein ablation to include the left atrial roof, and the left atrial inferior wall, atrial tachycardia arising from the left atrial posterior wall, orthodromic reciprocating tachycardia use of right atrial anterior/free wall manifest accessory pathway/ablation of Desiree-Parkinson-White syndrome.

## (undated) DEVICE — DOME MONITORING W BONDED STPCK BIOTRANS2

## (undated) DEVICE — ELECTRD RETRN/GRND MEGADYNE SGL/PLT W/CORD 9FT DISP

## (undated) DEVICE — CATH ULTRASND ECHO ACUNAV FOR ACUSON 8F 90CM

## (undated) DEVICE — STERILE (15.2 TAPERED TO 7.6 X 183CM) POLYETHYLENE ACCORDION-FOLDED COVER FOR USE WITH SIEMENS ACUNAV ULTRASOUND CATHETER FAMILY CONNECTOR: Brand: SWIFTLINK TRANSDUCER COVER

## (undated) DEVICE — KT MANIFLD EP

## (undated) DEVICE — SYS CLS VASC/VENI VASCADE MVP 6TO12F

## (undated) DEVICE — SI AVANTI+ 6F STD W/GW  NO OBT: Brand: AVANTI

## (undated) DEVICE — Device: Brand: WEBSTER CS

## (undated) DEVICE — INTRO SHEATH ENGAGE W/50 GW .038 9F12

## (undated) DEVICE — ST EXT IV SMRTSTE 2VLV FIX M LL 6ML 41

## (undated) DEVICE — DECANT BG O JET

## (undated) DEVICE — ACUMEN IQ CUFF ADULT: Brand: ACUMEN IQ CUFF ADULT

## (undated) DEVICE — KT DISP ARRHYTHMIA VMAP 91200008 DISP

## (undated) DEVICE — 1 X VERSACROSS CONNECT TRANSSEPTAL DILATOR;  1 X VERSACROSS RF WIRE (INCLUDING 1 X CONNECTOR CABLE (SINGLE USE)): Brand: VERSACROSS CONNECT ACCESS SOLUTION FOR FARADRIVE

## (undated) DEVICE — SI AVANTI+ 8F STD W/GW  NO OBT: Brand: AVANTI

## (undated) DEVICE — LOCATION REFERENCE PATCH KIT: Brand: RHYTHMIA™ MAPPING SYSTEM

## (undated) DEVICE — ST EXT IV SMARTSITE 2VLV SP M LL 5ML IV1

## (undated) DEVICE — DIAGNOSTIC ELECTRODE CATHETER: Brand: WOVEN

## (undated) DEVICE — PINNACLE INTRODUCER SHEATH: Brand: PINNACLE

## (undated) DEVICE — LIMB HOLDER, WRIST/ANKLE: Brand: DEROYAL

## (undated) DEVICE — Device: Brand: MEDEX

## (undated) DEVICE — CATHETER CONNECTION CABLE: Brand: FARASTAR™

## (undated) DEVICE — LEX ELECTRO PHYSIOLOGY: Brand: MEDLINE INDUSTRIES, INC.

## (undated) DEVICE — SOL NACL 0.9PCT 1000ML

## (undated) DEVICE — ABLATION CATHETER: Brand: INTELLANAV MIFI™ OPEN-IRRIGATED

## (undated) DEVICE — HIGH RESOLUTION MAPPING CATHETER: Brand: INTELLAMAP ORION™

## (undated) DEVICE — ST EXT IV SMARTSITE PINCH/CLMP 5ML 46CM

## (undated) DEVICE — ADULT, W/LG. BACK PAD, RADIOTRANSPARENT ELEMENT AND LEAD WIRE: Brand: DEFIBRILLATION ELECTRODES

## (undated) DEVICE — CANN NASL CO2 DIVIDED A/

## (undated) DEVICE — PULSED FIELD ABLATION CATHETER: Brand: FARAWAVE™ NAV

## (undated) DEVICE — DRSNG SURESITE123 4X4.8IN

## (undated) DEVICE — INTRO STEER AGILIS NXT MED/CURL 8.5F

## (undated) DEVICE — SET PRIMARY GRVTY 10DP MALE LL 104IN

## (undated) DEVICE — ADULT, W/LG. BACK PAD, RADIOTRANSPARENT ELEMENT AND LEAD WIRE COMPATIBLE W/: Brand: DEFIBRILLATION ELECTRODES

## (undated) DEVICE — MYNX CONTROL™ VENOUS VASCULAR CLOSURE DEVICE 6F-12F: Brand: MYNX CONTROL™ VENOUS VASCULAR CLOSURE DEVICE 6F-12F

## (undated) DEVICE — PRESSURE MONITORING SET: Brand: TRUWAVE

## (undated) DEVICE — INTRO STEER AGILIS/NXT DUAL/REACH 13F 18.4MM 71CM 0.038IN SM

## (undated) DEVICE — ST INF PRI SMRTSTE 20DRP 2VLV 24ML 117